# Patient Record
Sex: FEMALE | Race: ASIAN | Employment: UNEMPLOYED | URBAN - METROPOLITAN AREA
[De-identification: names, ages, dates, MRNs, and addresses within clinical notes are randomized per-mention and may not be internally consistent; named-entity substitution may affect disease eponyms.]

---
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
## 2021-06-07 ENCOUNTER — APPOINTMENT (OUTPATIENT)
Dept: GENERAL RADIOLOGY | Age: 21
End: 2021-06-07
Attending: NURSE PRACTITIONER
Payer: MEDICAID

## 2021-06-07 ENCOUNTER — HOSPITAL ENCOUNTER (EMERGENCY)
Age: 21
Discharge: HOME | End: 2021-06-07
Attending: STUDENT IN AN ORGANIZED HEALTH CARE EDUCATION/TRAINING PROGRAM
Payer: MEDICAID

## 2021-06-07 ENCOUNTER — APPOINTMENT (OUTPATIENT)
Dept: CT IMAGING | Age: 21
End: 2021-06-07
Attending: NURSE PRACTITIONER
Payer: MEDICAID

## 2021-06-07 ENCOUNTER — APPOINTMENT (OUTPATIENT)
Dept: ULTRASOUND IMAGING | Age: 21
End: 2021-06-07
Attending: NURSE PRACTITIONER
Payer: MEDICAID

## 2021-06-07 VITALS
RESPIRATION RATE: 16 BRPM | TEMPERATURE: 98.3 F | OXYGEN SATURATION: 100 % | SYSTOLIC BLOOD PRESSURE: 126 MMHG | HEART RATE: 80 BPM | DIASTOLIC BLOOD PRESSURE: 78 MMHG | WEIGHT: 115.08 LBS

## 2021-06-07 DIAGNOSIS — R05.9 COUGH: Primary | ICD-10-CM

## 2021-06-07 DIAGNOSIS — K59.00 CONSTIPATION, UNSPECIFIED CONSTIPATION TYPE: ICD-10-CM

## 2021-06-07 DIAGNOSIS — R10.84 ABDOMINAL PAIN, GENERALIZED: ICD-10-CM

## 2021-06-07 LAB
ALBUMIN SERPL-MCNC: 3.8 G/DL (ref 3.5–5)
ALBUMIN/GLOB SERPL: 1 {RATIO} (ref 1.1–2.2)
ALP SERPL-CCNC: 91 U/L (ref 45–117)
ALT SERPL-CCNC: 21 U/L (ref 12–78)
ANION GAP SERPL CALC-SCNC: 2 MMOL/L (ref 5–15)
APPEARANCE UR: CLEAR
AST SERPL-CCNC: 16 U/L (ref 15–37)
BACTERIA URNS QL MICRO: ABNORMAL /HPF
BASOPHILS # BLD: 0 K/UL (ref 0–0.1)
BASOPHILS NFR BLD: 1 % (ref 0–1)
BILIRUB SERPL-MCNC: 0.2 MG/DL (ref 0.2–1)
BILIRUB UR QL: NEGATIVE
BUN SERPL-MCNC: 3 MG/DL (ref 6–20)
BUN/CREAT SERPL: 4 (ref 12–20)
CALCIUM SERPL-MCNC: 9 MG/DL (ref 8.5–10.1)
CHLORIDE SERPL-SCNC: 111 MMOL/L (ref 97–108)
CO2 SERPL-SCNC: 27 MMOL/L (ref 21–32)
COLOR UR: ABNORMAL
COMMENT, HOLDF: NORMAL
CREAT SERPL-MCNC: 0.68 MG/DL (ref 0.55–1.02)
CRP SERPL-MCNC: <0.29 MG/DL (ref 0–0.6)
DIFFERENTIAL METHOD BLD: ABNORMAL
EOSINOPHIL # BLD: 0.5 K/UL (ref 0–0.4)
EOSINOPHIL NFR BLD: 7 % (ref 0–7)
EPITH CASTS URNS QL MICRO: ABNORMAL /LPF
ERYTHROCYTE [DISTWIDTH] IN BLOOD BY AUTOMATED COUNT: 15.7 % (ref 11.5–14.5)
ERYTHROCYTE [SEDIMENTATION RATE] IN BLOOD: 17 MM/HR (ref 0–20)
GLOBULIN SER CALC-MCNC: 4 G/DL (ref 2–4)
GLUCOSE SERPL-MCNC: 94 MG/DL (ref 65–100)
GLUCOSE UR STRIP.AUTO-MCNC: NEGATIVE MG/DL
HCG UR QL: NEGATIVE
HCT VFR BLD AUTO: 31.9 % (ref 35–47)
HGB BLD-MCNC: 9.7 G/DL (ref 11.5–16)
HGB UR QL STRIP: NEGATIVE
HYALINE CASTS URNS QL MICRO: ABNORMAL /LPF (ref 0–5)
IMM GRANULOCYTES # BLD AUTO: 0 K/UL (ref 0–0.04)
IMM GRANULOCYTES NFR BLD AUTO: 0 % (ref 0–0.5)
KETONES UR QL STRIP.AUTO: NEGATIVE MG/DL
LEUKOCYTE ESTERASE UR QL STRIP.AUTO: NEGATIVE
LYMPHOCYTES # BLD: 1.6 K/UL (ref 0.8–3.5)
LYMPHOCYTES NFR BLD: 20 % (ref 12–49)
MCH RBC QN AUTO: 24.1 PG (ref 26–34)
MCHC RBC AUTO-ENTMCNC: 30.4 G/DL (ref 30–36.5)
MCV RBC AUTO: 79.4 FL (ref 80–99)
MONOCYTES # BLD: 0.7 K/UL (ref 0–1)
MONOCYTES NFR BLD: 9 % (ref 5–13)
NEUTS SEG # BLD: 4.9 K/UL (ref 1.8–8)
NEUTS SEG NFR BLD: 63 % (ref 32–75)
NITRITE UR QL STRIP.AUTO: NEGATIVE
NRBC # BLD: 0 K/UL (ref 0–0.01)
NRBC BLD-RTO: 0 PER 100 WBC
PH UR STRIP: 6 [PH] (ref 5–8)
PLATELET # BLD AUTO: 263 K/UL (ref 150–400)
PMV BLD AUTO: 10.8 FL (ref 8.9–12.9)
POTASSIUM SERPL-SCNC: 3.8 MMOL/L (ref 3.5–5.1)
PROT SERPL-MCNC: 7.8 G/DL (ref 6.4–8.2)
PROT UR STRIP-MCNC: NEGATIVE MG/DL
RBC # BLD AUTO: 4.02 M/UL (ref 3.8–5.2)
RBC #/AREA URNS HPF: ABNORMAL /HPF (ref 0–5)
SAMPLES BEING HELD,HOLD: NORMAL
SARS-COV-2, COV2: NORMAL
SODIUM SERPL-SCNC: 140 MMOL/L (ref 136–145)
SP GR UR REFRACTOMETRY: 1.02 (ref 1–1.03)
UR CULT HOLD, URHOLD: NORMAL
UROBILINOGEN UR QL STRIP.AUTO: 0.2 EU/DL (ref 0.2–1)
WBC # BLD AUTO: 7.7 K/UL (ref 3.6–11)
WBC URNS QL MICRO: ABNORMAL /HPF (ref 0–4)

## 2021-06-07 PROCEDURE — 86140 C-REACTIVE PROTEIN: CPT

## 2021-06-07 PROCEDURE — 81025 URINE PREGNANCY TEST: CPT

## 2021-06-07 PROCEDURE — 36415 COLL VENOUS BLD VENIPUNCTURE: CPT

## 2021-06-07 PROCEDURE — 85652 RBC SED RATE AUTOMATED: CPT

## 2021-06-07 PROCEDURE — 71046 X-RAY EXAM CHEST 2 VIEWS: CPT

## 2021-06-07 PROCEDURE — 99284 EMERGENCY DEPT VISIT MOD MDM: CPT

## 2021-06-07 PROCEDURE — 80053 COMPREHEN METABOLIC PANEL: CPT

## 2021-06-07 PROCEDURE — 74011250637 HC RX REV CODE- 250/637: Performed by: NURSE PRACTITIONER

## 2021-06-07 PROCEDURE — 76856 US EXAM PELVIC COMPLETE: CPT

## 2021-06-07 PROCEDURE — 85025 COMPLETE CBC W/AUTO DIFF WBC: CPT

## 2021-06-07 PROCEDURE — 81001 URINALYSIS AUTO W/SCOPE: CPT

## 2021-06-07 PROCEDURE — U0005 INFEC AGEN DETEC AMPLI PROBE: HCPCS

## 2021-06-07 PROCEDURE — 74011000636 HC RX REV CODE- 636: Performed by: RADIOLOGY

## 2021-06-07 PROCEDURE — 74177 CT ABD & PELVIS W/CONTRAST: CPT

## 2021-06-07 PROCEDURE — 76830 TRANSVAGINAL US NON-OB: CPT

## 2021-06-07 RX ORDER — IBUPROFEN 600 MG/1
600 TABLET ORAL
Qty: 20 TABLET | Refills: 0 | Status: SHIPPED | OUTPATIENT
Start: 2021-06-07 | End: 2022-01-03 | Stop reason: ALTCHOICE

## 2021-06-07 RX ORDER — IBUPROFEN 600 MG/1
600 TABLET ORAL
Status: COMPLETED | OUTPATIENT
Start: 2021-06-07 | End: 2021-06-07

## 2021-06-07 RX ORDER — POLYETHYLENE GLYCOL 3350 17 G/17G
17 POWDER, FOR SOLUTION ORAL DAILY
Qty: 595 G | Refills: 0 | Status: SHIPPED | OUTPATIENT
Start: 2021-06-07 | End: 2022-01-03 | Stop reason: ALTCHOICE

## 2021-06-07 RX ADMIN — IBUPROFEN 600 MG: 600 TABLET ORAL at 17:12

## 2021-06-07 RX ADMIN — IOPAMIDOL 100 ML: 755 INJECTION, SOLUTION INTRAVENOUS at 18:42

## 2021-06-07 NOTE — DISCHARGE INSTRUCTIONS
Start miralax tomorrow as prescribed  Drink more water, eat more fruits and vegetables and fiber in diet  Follow up with your primary provider

## 2021-06-07 NOTE — ED TRIAGE NOTES
Triage: Pt brought in for cough started 3-4 days. CP/stomach pain with coughing. Subjective fever on Saturday. No sick contacts. Took dayquil PTA.

## 2021-06-07 NOTE — ED PROVIDER NOTES
This is a 56-year-old female with chief complaint of cough and chest pain with coughing and abdominal pain for the last 3 days. She states when she coughs it makes her abdominal pain worse. She denies any vomiting or diarrhea but she has had tactile fever 2 days ago on Saturday she states she does not think she has had a fever since then. She did take a dose of DayQuil today no other medications taken besides some Motrin when she had the fever. She denies any headache sore throat neck pain or back pain. She denies dysuria hematuria or urinary frequency. She reports she has been drinking well with decreased appetite. Past medical history: Anemia, headaches  Social: Vaccines up-to-date lives at home with family; she is vaccinated for Covid    The history is provided by the patient. Cough  Pertinent negatives include no chest pain, no headaches, no sore throat, no wheezing and no vomiting. Past Medical History:   Diagnosis Date    Anemia     Headache        History reviewed. No pertinent surgical history.       Family History:   Problem Relation Age of Onset    No Known Problems Mother     Hypertension Father        Social History     Socioeconomic History    Marital status: SINGLE     Spouse name: Not on file    Number of children: Not on file    Years of education: Not on file    Highest education level: Not on file   Occupational History    Not on file   Tobacco Use    Smoking status: Never Smoker    Smokeless tobacco: Never Used   Substance and Sexual Activity    Alcohol use: No    Drug use: No    Sexual activity: Never   Other Topics Concern    Not on file   Social History Narrative    Not on file     Social Determinants of Health     Financial Resource Strain:     Difficulty of Paying Living Expenses:    Food Insecurity:     Worried About Running Out of Food in the Last Year:     Ran Out of Food in the Last Year:    Transportation Needs:     Lack of Transportation (Medical):  Lack of Transportation (Non-Medical):    Physical Activity:     Days of Exercise per Week:     Minutes of Exercise per Session:    Stress:     Feeling of Stress :    Social Connections:     Frequency of Communication with Friends and Family:     Frequency of Social Gatherings with Friends and Family:     Attends Spiritism Services:     Active Member of Clubs or Organizations:     Attends Club or Organization Meetings:     Marital Status:    Intimate Partner Violence:     Fear of Current or Ex-Partner:     Emotionally Abused:     Physically Abused:     Sexually Abused: ALLERGIES: Patient has no known allergies. Review of Systems   Constitutional: Negative. Negative for activity change, appetite change and fever. HENT: Negative. Negative for sore throat. Respiratory: Positive for cough. Negative for wheezing. Cardiovascular: Negative. Negative for chest pain. Gastrointestinal: Positive for abdominal pain. Negative for diarrhea and vomiting. Genitourinary: Negative. Musculoskeletal: Negative. Negative for back pain and neck pain. Skin: Negative. Negative for rash. Neurological: Negative. Negative for headaches. All other systems reviewed and are negative. Vitals:    06/07/21 1607 06/07/21 1614   BP: (!) 132/91    Pulse: 90    Resp: 16    Temp:  98.3 °F (36.8 °C)   SpO2: 100%    Weight: 52.2 kg (115 lb 1.3 oz)             Physical Exam  Vitals and nursing note reviewed. Constitutional:       General: She is not in acute distress. Appearance: Normal appearance. She is well-developed. She is not ill-appearing. HENT:      Right Ear: Tympanic membrane and external ear normal.      Left Ear: Tympanic membrane and external ear normal.      Mouth/Throat:      Mouth: Mucous membranes are moist.      Pharynx: No oropharyngeal exudate or posterior oropharyngeal erythema. Eyes:      Pupils: Pupils are equal, round, and reactive to light.    Cardiovascular: Rate and Rhythm: Normal rate and regular rhythm. Heart sounds: Normal heart sounds. Pulmonary:      Effort: Pulmonary effort is normal. Tachypnea present. No respiratory distress. Breath sounds: Normal breath sounds. No decreased breath sounds, wheezing, rhonchi or rales. Comments: Lungs clear to auscultation although she does appear to have some mild increased work of breathing on exam.  Abdominal:      General: Abdomen is flat. Bowel sounds are normal. There is no distension. Palpations: Abdomen is soft. Tenderness: There is abdominal tenderness in the right lower quadrant, suprapubic area and left lower quadrant. There is guarding. There is no rebound. Musculoskeletal:         General: No tenderness. Normal range of motion. Cervical back: Normal range of motion and neck supple. Lymphadenopathy:      Cervical: No cervical adenopathy. Skin:     General: Skin is warm and dry. Capillary Refill: Capillary refill takes less than 2 seconds. Neurological:      General: No focal deficit present. Mental Status: She is alert and oriented to person, place, and time. Psychiatric:         Mood and Affect: Mood normal.          MDM  Number of Diagnoses or Management Options  Abdominal pain, generalized  Constipation, unspecified constipation type  Cough  Diagnosis management comments: 71-year-old female with cough and URI symptoms for the last 3 days now with some chest pain and abdominal pain worse with coughing. She has no hypoxia and mild increased work of breathing on exam.  Lungs are clear without any wheezing rales or rhonchi. No fever here.     Plan: Chest x-ray, ultrasound abdomen and urine hCG and UA       Amount and/or Complexity of Data Reviewed  Clinical lab tests: ordered and reviewed  Tests in the radiology section of CPT®: ordered and reviewed  Obtain history from someone other than the patient: yes    Risk of Complications, Morbidity, and/or Mortality  Presenting problems: moderate           Procedures      Recent Results (from the past 24 hour(s))   URINALYSIS W/MICROSCOPIC    Collection Time: 06/07/21  4:23 PM   Result Value Ref Range    Color YELLOW/STRAW      Appearance CLEAR CLEAR      Specific gravity 1.021 1.003 - 1.030      pH (UA) 6.0 5.0 - 8.0      Protein Negative NEG mg/dL    Glucose Negative NEG mg/dL    Ketone Negative NEG mg/dL    Bilirubin Negative NEG      Blood Negative NEG      Urobilinogen 0.2 0.2 - 1.0 EU/dL    Nitrites Negative NEG      Leukocyte Esterase Negative NEG      WBC 0-4 0 - 4 /hpf    RBC 0-5 0 - 5 /hpf    Epithelial cells FEW FEW /lpf    Bacteria 1+ (A) NEG /hpf    Hyaline cast 0-2 0 - 5 /lpf   URINE CULTURE HOLD SAMPLE    Collection Time: 06/07/21  4:23 PM    Specimen: Serum; Urine   Result Value Ref Range    Urine culture hold        Urine on hold in Microbiology dept for 2 days. If unpreserved urine is submitted, it cannot be used for addtional testing after 24 hours, recollection will be required. HCG URINE, QL. - POC    Collection Time: 06/07/21  4:24 PM   Result Value Ref Range    Pregnancy test,urine (POC) Negative NEG         XR CHEST PA LAT    Result Date: 6/7/2021  Indication:  cough, chest pain Exam: PA and lateral views of the chest. Direct comparison is made to prior CXR dated April 7, 2017. Findings: Cardiomediastinal silhouette is within normal limits. Lungs are clear bilaterally. Pleural spaces are normal. Osseous structures are intact. No acute cardiopulmonary disease. US TRANSVAGINAL    Result Date: 6/7/2021  Clinical history: lower abdominal pain, worse on right side INDICATION:   lower abdominal pain, worse on right side COMPARISON/CORRELATION STUDY: 8.8 x 3.7 x 5.4 FINDINGS: PELVIC ULTRASOUND: Realtime sonographic imaging of the pelvis was performed transabdominally. Limited evaluation due to bowel gas/.     Limited visualization due to bowel gas.  Correlation with transvaginal ultrasound will be performed. TRANSVAGINAL ULTRASOUND: Realtime sonographic imaging of the pelvis was performed transvaginally. The uterus  is normal in appearance. The endometrial stripe measures 7 mm. The right ovary measures 3.0 x 1.7 cm and the left ovary measures 3.5 x 2.6 cm. There is normal flow identified to the ovaries. Small left ovarian cyst. The ovaries are normal in appearance. No free fluid. IMPRESSION: Normal pelvic ultrasound. US PELV NON OBS    Result Date: 6/7/2021  Clinical history: lower abdominal pain, worse on right side INDICATION:   lower abdominal pain, worse on right side COMPARISON/CORRELATION STUDY: 8.8 x 3.7 x 5.4 FINDINGS: PELVIC ULTRASOUND: Realtime sonographic imaging of the pelvis was performed transabdominally. Limited evaluation due to bowel gas/.     Limited visualization due to bowel gas. Correlation with transvaginal ultrasound will be performed. TRANSVAGINAL ULTRASOUND: Realtime sonographic imaging of the pelvis was performed transvaginally. The uterus  is normal in appearance. The endometrial stripe measures 7 mm. The right ovary measures 3.0 x 1.7 cm and the left ovary measures 3.5 x 2.6 cm. There is normal flow identified to the ovaries. Small left ovarian cyst. The ovaries are normal in appearance. No free fluid. IMPRESSION: Normal pelvic ultrasound. Ultrasound , cxr, and ua results reviewed; patient still with rlq abdominal pain on exam; will obtain ct scan abd pel and labs; patient agreeable with plan.       Recent Results (from the past 24 hour(s))   URINALYSIS W/MICROSCOPIC    Collection Time: 06/07/21  4:23 PM   Result Value Ref Range    Color YELLOW/STRAW      Appearance CLEAR CLEAR      Specific gravity 1.021 1.003 - 1.030      pH (UA) 6.0 5.0 - 8.0      Protein Negative NEG mg/dL    Glucose Negative NEG mg/dL    Ketone Negative NEG mg/dL    Bilirubin Negative NEG      Blood Negative NEG      Urobilinogen 0.2 0.2 - 1.0 EU/dL    Nitrites Negative NEG Leukocyte Esterase Negative NEG      WBC 0-4 0 - 4 /hpf    RBC 0-5 0 - 5 /hpf    Epithelial cells FEW FEW /lpf    Bacteria 1+ (A) NEG /hpf    Hyaline cast 0-2 0 - 5 /lpf   URINE CULTURE HOLD SAMPLE    Collection Time: 06/07/21  4:23 PM    Specimen: Serum; Urine   Result Value Ref Range    Urine culture hold        Urine on hold in Microbiology dept for 2 days. If unpreserved urine is submitted, it cannot be used for addtional testing after 24 hours, recollection will be required. HCG URINE, QL. - POC    Collection Time: 06/07/21  4:24 PM   Result Value Ref Range    Pregnancy test,urine (POC) Negative NEG     SARS-COV-2    Collection Time: 06/07/21  5:39 PM   Result Value Ref Range    SARS-CoV-2 Please find results under separate order     CBC WITH AUTOMATED DIFF    Collection Time: 06/07/21  5:39 PM   Result Value Ref Range    WBC 7.7 3.6 - 11.0 K/uL    RBC 4.02 3.80 - 5.20 M/uL    HGB 9.7 (L) 11.5 - 16.0 g/dL    HCT 31.9 (L) 35.0 - 47.0 %    MCV 79.4 (L) 80.0 - 99.0 FL    MCH 24.1 (L) 26.0 - 34.0 PG    MCHC 30.4 30.0 - 36.5 g/dL    RDW 15.7 (H) 11.5 - 14.5 %    PLATELET 738 276 - 192 K/uL    MPV 10.8 8.9 - 12.9 FL    NRBC 0.0 0  WBC    ABSOLUTE NRBC 0.00 0.00 - 0.01 K/uL    NEUTROPHILS 63 32 - 75 %    LYMPHOCYTES 20 12 - 49 %    MONOCYTES 9 5 - 13 %    EOSINOPHILS 7 0 - 7 %    BASOPHILS 1 0 - 1 %    IMMATURE GRANULOCYTES 0 0.0 - 0.5 %    ABS. NEUTROPHILS 4.9 1.8 - 8.0 K/UL    ABS. LYMPHOCYTES 1.6 0.8 - 3.5 K/UL    ABS. MONOCYTES 0.7 0.0 - 1.0 K/UL    ABS. EOSINOPHILS 0.5 (H) 0.0 - 0.4 K/UL    ABS. BASOPHILS 0.0 0.0 - 0.1 K/UL    ABS. IMM.  GRANS. 0.0 0.00 - 0.04 K/UL    DF AUTOMATED     METABOLIC PANEL, COMPREHENSIVE    Collection Time: 06/07/21  5:39 PM   Result Value Ref Range    Sodium 140 136 - 145 mmol/L    Potassium 3.8 3.5 - 5.1 mmol/L    Chloride 111 (H) 97 - 108 mmol/L    CO2 27 21 - 32 mmol/L    Anion gap 2 (L) 5 - 15 mmol/L    Glucose 94 65 - 100 mg/dL    BUN 3 (L) 6 - 20 MG/DL    Creatinine 0. 68 0.55 - 1.02 MG/DL    BUN/Creatinine ratio 4 (L) 12 - 20      GFR est AA >60 >60 ml/min/1.73m2    GFR est non-AA >60 >60 ml/min/1.73m2    Calcium 9.0 8.5 - 10.1 MG/DL    Bilirubin, total 0.2 0.2 - 1.0 MG/DL    ALT (SGPT) 21 12 - 78 U/L    AST (SGOT) 16 15 - 37 U/L    Alk. phosphatase 91 45 - 117 U/L    Protein, total 7.8 6.4 - 8.2 g/dL    Albumin 3.8 3.5 - 5.0 g/dL    Globulin 4.0 2.0 - 4.0 g/dL    A-G Ratio 1.0 (L) 1.1 - 2.2     C REACTIVE PROTEIN, QT    Collection Time: 06/07/21  5:39 PM   Result Value Ref Range    C-Reactive protein <0.29 0.00 - 0.60 mg/dL   SED RATE (ESR)    Collection Time: 06/07/21  5:39 PM   Result Value Ref Range    Sed rate, automated 17 0 - 20 mm/hr   SAMPLES BEING HELD    Collection Time: 06/07/21  5:39 PM   Result Value Ref Range    SAMPLES BEING HELD 1RED     COMMENT        Add-on orders for these samples will be processed based on acceptable specimen integrity and analyte stability, which may vary by analyte. XR CHEST PA LAT    Result Date: 6/7/2021  Indication:  cough, chest pain Exam: PA and lateral views of the chest. Direct comparison is made to prior CXR dated April 7, 2017. Findings: Cardiomediastinal silhouette is within normal limits. Lungs are clear bilaterally. Pleural spaces are normal. Osseous structures are intact. No acute cardiopulmonary disease. CT ABD PELV W CONT    Result Date: 6/7/2021  CLINICAL HISTORY: Right lower quadrant pain INDICATION: Right lower quadrant pain COMPARISON: None. CONTRAST: 100  ml Isovue 370 TECHNIQUE: Multislice helical CT was performed of the abdomen and pelvis following uneventful rapid bolus intravenous contrast administration. Oral contrast was not administered. Contiguous 5 mm axial images were reconstructed and lung and soft tissue windows were generated. Coronal and sagittal reformations were generated.   CT dose reduction was achieved through use of a standardized protocol tailored for this examination and automatic exposure control for dose modulation. FINDINGS: LUNG BASES:No mass lesion or effusion. LIVER: No mass or biliary dilatation. There is no intrahepatic duct dilatation. There is no hepatic parenchymal mass. Hepatic enhancement pattern is within normal limits. Portal vein is patent. GALLBLADDER:  No dilatation or wall thickening. SPLEEN/PANCREAS: No mass. There is no pancreatic duct dilatation. There is no evidence of splenomegaly. ADRENALS/KIDNEYS: No mass. There is no hydronephrosis. There is no renal mass. There is no perinephric mass. STOMACH: No dilatation or wall thickening. COLON AND SMALL BOWEL: Fecal stasis. There is no free intraperitoneal air. There is no evidence of incarceration or obstruction. No mesenteric adenopathy. PERITONEUM: Unremarkable. APPENDIX: Unremarkable. BLADDER/REPRODUCTIVE ORGANS: No mass or calculus. RETROPERITONEUM: Unremarkable. The abdominal aorta is normal in caliber. No aneurysm. No retroperitoneal adenopathy. OSSEOUS STRUCTURES: No destructive bone lesion. No acute intraperitoneal process is identified. Please see above for additional nonemergent incidental findings. US TRANSVAGINAL    Result Date: 6/7/2021  Clinical history: lower abdominal pain, worse on right side INDICATION:   lower abdominal pain, worse on right side COMPARISON/CORRELATION STUDY: 8.8 x 3.7 x 5.4 FINDINGS: PELVIC ULTRASOUND: Realtime sonographic imaging of the pelvis was performed transabdominally. Limited evaluation due to bowel gas/.     Limited visualization due to bowel gas. Correlation with transvaginal ultrasound will be performed. TRANSVAGINAL ULTRASOUND: Realtime sonographic imaging of the pelvis was performed transvaginally. The uterus  is normal in appearance. The endometrial stripe measures 7 mm. The right ovary measures 3.0 x 1.7 cm and the left ovary measures 3.5 x 2.6 cm. There is normal flow identified to the ovaries. Small left ovarian cyst. The ovaries are normal in appearance.  No free fluid. IMPRESSION: Normal pelvic ultrasound. US PELV NON OBS    Result Date: 6/7/2021  Clinical history: lower abdominal pain, worse on right side INDICATION:   lower abdominal pain, worse on right side COMPARISON/CORRELATION STUDY: 8.8 x 3.7 x 5.4 FINDINGS: PELVIC ULTRASOUND: Realtime sonographic imaging of the pelvis was performed transabdominally. Limited evaluation due to bowel gas/.     Limited visualization due to bowel gas. Correlation with transvaginal ultrasound will be performed. TRANSVAGINAL ULTRASOUND: Realtime sonographic imaging of the pelvis was performed transvaginally. The uterus  is normal in appearance. The endometrial stripe measures 7 mm. The right ovary measures 3.0 x 1.7 cm and the left ovary measures 3.5 x 2.6 cm. There is normal flow identified to the ovaries. Small left ovarian cyst. The ovaries are normal in appearance. No free fluid. IMPRESSION: Normal pelvic ultrasound. Patient's results have been reviewed with them. Patient and /or family have verbally conveyed understanding and agreement of the patient's signs, symptoms, diagnosis, treatment and prognosis and additionally agree to follow up as recommended or return to the Emergency Department should their condition change prior to follow-up. Discharge instructions have also been provided to the patient with some educational information regarding their diagnosis as well as a list of reasons why they would want to return to the ER prior to their follow-up appointment should their condition change.

## 2021-06-07 NOTE — ED NOTES
Pt resting comfortably in stretcher. No needs @ this time. Plan of care updated and reports improved pain to stomach.

## 2021-06-08 ENCOUNTER — PATIENT OUTREACH (OUTPATIENT)
Dept: CASE MANAGEMENT | Age: 21
End: 2021-06-08

## 2021-06-08 LAB
SARS-COV-2, XPLCVT: NOT DETECTED
SOURCE, COVRS: NORMAL

## 2021-06-08 NOTE — ED NOTES
Pt discharged home accompanied by visitor. Pt acting appropriately, respirations regular and unlabored, cap refill less than two seconds. Skin pink, dry and warm. Lungs clear bilaterally. No further complaints at this time. Patient verbalized understanding of discharge paperwork and has no further questions at this time. Education provided about continuation of care, follow up care and medication administration: . Patient able to provided teach back about discharge instructions.

## 2021-06-08 NOTE — PROGRESS NOTES
Patient contacted regarding COVID-19 risk. Discussed COVID-19 related testing which was available at this time. Test results were pending. Patient informed of results, if available? no.     LPN Care Coordinator contacted the patient by telephone to perform post discharge assessment. Call within 2 business days of discharge: Yes Verified name and  with patient as identifiers. Provided introduction to self, and explanation of the CTN/ACM role, and reason for call due to risk factors for infection and/or exposure to COVID-19. Symptoms reviewed with patient who verbalized the following symptoms: no worsening symptoms      Due to no new or worsening symptoms encounter was not routed to provider for escalation. Discussed follow-up appointments. If no appointment was previously scheduled, appointment scheduling offered:  no. [facility] follow up appointment(s): No future appointments. Non-[facility] follow up appointment(s): Encouraged to follow-up with new provider per discharge instructions. Interventions to address risk factors: Obtained and reviewed discharge summary and/or continuity of care documents     Advance Care Planning:   Does patient have an Advance Directive: patient declined education. Educated patient about risk for severe COVID-19 due to risk factors according to CDC guidelines. LPN CC reviewed discharge instructions, medical action plan and red flag symptoms with the patient who verbalized understanding. Discussed COVID vaccination status: no. Education provided on COVID-19 vaccination as appropriate. Discussed exposure protocols and quarantine with CDC Guidelines. Patient was given an opportunity to verbalize any questions and concerns and agrees to contact LPN CC or health care provider for questions related to their healthcare. Reviewed and educated patient on any new and changed medications related to discharge diagnosis     Was patient discharged with a pulse oximeter?  no Discussed and confirmed pulse oximeter discharge instructions and when to notify provider or seek emergency care. LPN CC provided contact information. Plan for follow-up call in 7-14 days based on severity of symptoms and risk factors.

## 2021-06-28 ENCOUNTER — PATIENT OUTREACH (OUTPATIENT)
Dept: CASE MANAGEMENT | Age: 21
End: 2021-06-28

## 2021-06-28 NOTE — PROGRESS NOTES
Patient resolved from Transition of Care episode on 06/28/21. ACM/CTN was unsuccessful at contacting this patient today. Patient/family was provided the following resources and education related to COVID-19 during the initial call:                         Signs, symptoms and red flags related to COVID-19            CDC exposure and quarantine guidelines            [employer] exposure contact - [phone]            Contact for their local Department of Health                 Patient has not had any additional ED or hospital visits. No further outreach scheduled with this CTN/ACM. Episode of Care resolved. Patient has this CTN/ACM contact information if future needs arise.

## 2021-10-26 ENCOUNTER — HOSPITAL ENCOUNTER (EMERGENCY)
Age: 21
Discharge: HOME | End: 2021-10-26
Attending: EMERGENCY MEDICINE
Payer: MEDICAID

## 2021-10-26 ENCOUNTER — APPOINTMENT (OUTPATIENT)
Dept: ULTRASOUND IMAGING | Age: 21
End: 2021-10-26
Attending: EMERGENCY MEDICINE
Payer: MEDICAID

## 2021-10-26 VITALS
TEMPERATURE: 98.1 F | OXYGEN SATURATION: 100 % | WEIGHT: 117.28 LBS | SYSTOLIC BLOOD PRESSURE: 107 MMHG | DIASTOLIC BLOOD PRESSURE: 66 MMHG | RESPIRATION RATE: 16 BRPM | HEART RATE: 101 BPM

## 2021-10-26 DIAGNOSIS — Z32.01 PREGNANCY TEST PERFORMED, PREGNANCY CONFIRMED: Primary | ICD-10-CM

## 2021-10-26 LAB
ALBUMIN SERPL-MCNC: 3.1 G/DL (ref 3.5–5)
ALBUMIN/GLOB SERPL: 0.7 {RATIO} (ref 1.1–2.2)
ALP SERPL-CCNC: 65 U/L (ref 45–117)
ALT SERPL-CCNC: 19 U/L (ref 12–78)
ANION GAP SERPL CALC-SCNC: 7 MMOL/L (ref 5–15)
APPEARANCE UR: CLEAR
AST SERPL-CCNC: 20 U/L (ref 15–37)
BACTERIA URNS QL MICRO: ABNORMAL /HPF
BASOPHILS # BLD: 0 K/UL (ref 0–0.1)
BASOPHILS NFR BLD: 0 % (ref 0–1)
BILIRUB SERPL-MCNC: 0.3 MG/DL (ref 0.2–1)
BILIRUB UR QL: NEGATIVE
BUN SERPL-MCNC: 4 MG/DL (ref 6–20)
BUN/CREAT SERPL: 9 (ref 12–20)
CALCIUM SERPL-MCNC: 8.9 MG/DL (ref 8.5–10.1)
CHLORIDE SERPL-SCNC: 107 MMOL/L (ref 97–108)
CO2 SERPL-SCNC: 21 MMOL/L (ref 21–32)
COLOR UR: ABNORMAL
COMMENT, HOLDF: NORMAL
CREAT SERPL-MCNC: 0.46 MG/DL (ref 0.55–1.02)
DIFFERENTIAL METHOD BLD: ABNORMAL
EOSINOPHIL # BLD: 0.1 K/UL (ref 0–0.4)
EOSINOPHIL NFR BLD: 1 % (ref 0–7)
EPITH CASTS URNS QL MICRO: ABNORMAL /LPF
ERYTHROCYTE [DISTWIDTH] IN BLOOD BY AUTOMATED COUNT: 17.2 % (ref 11.5–14.5)
GLOBULIN SER CALC-MCNC: 4.4 G/DL (ref 2–4)
GLUCOSE SERPL-MCNC: 78 MG/DL (ref 65–100)
GLUCOSE UR STRIP.AUTO-MCNC: NEGATIVE MG/DL
HCG SERPL-ACNC: ABNORMAL MIU/ML (ref 0–6)
HCG UR QL: POSITIVE
HCT VFR BLD AUTO: 28.3 % (ref 35–47)
HGB BLD-MCNC: 9 G/DL (ref 11.5–16)
HGB UR QL STRIP: NEGATIVE
IMM GRANULOCYTES # BLD AUTO: 0.1 K/UL (ref 0–0.04)
IMM GRANULOCYTES NFR BLD AUTO: 1 % (ref 0–0.5)
KETONES UR QL STRIP.AUTO: NEGATIVE MG/DL
LEUKOCYTE ESTERASE UR QL STRIP.AUTO: NEGATIVE
LYMPHOCYTES # BLD: 1.1 K/UL (ref 0.8–3.5)
LYMPHOCYTES NFR BLD: 13 % (ref 12–49)
MCH RBC QN AUTO: 22.7 PG (ref 26–34)
MCHC RBC AUTO-ENTMCNC: 31.8 G/DL (ref 30–36.5)
MCV RBC AUTO: 71.3 FL (ref 80–99)
MONOCYTES # BLD: 0.6 K/UL (ref 0–1)
MONOCYTES NFR BLD: 7 % (ref 5–13)
NEUTS SEG # BLD: 6.8 K/UL (ref 1.8–8)
NEUTS SEG NFR BLD: 78 % (ref 32–75)
NITRITE UR QL STRIP.AUTO: NEGATIVE
NRBC # BLD: 0 K/UL (ref 0–0.01)
NRBC BLD-RTO: 0 PER 100 WBC
PH UR STRIP: 7 [PH] (ref 5–8)
PLATELET # BLD AUTO: 270 K/UL (ref 150–400)
PMV BLD AUTO: 10.8 FL (ref 8.9–12.9)
POTASSIUM SERPL-SCNC: 3.1 MMOL/L (ref 3.5–5.1)
PROT SERPL-MCNC: 7.5 G/DL (ref 6.4–8.2)
PROT UR STRIP-MCNC: NEGATIVE MG/DL
RBC # BLD AUTO: 3.97 M/UL (ref 3.8–5.2)
RBC #/AREA URNS HPF: ABNORMAL /HPF (ref 0–5)
SAMPLES BEING HELD,HOLD: NORMAL
SODIUM SERPL-SCNC: 135 MMOL/L (ref 136–145)
SP GR UR REFRACTOMETRY: 1.02 (ref 1–1.03)
UR CULT HOLD, URHOLD: NORMAL
UROBILINOGEN UR QL STRIP.AUTO: 0.2 EU/DL (ref 0.2–1)
WBC # BLD AUTO: 8.6 K/UL (ref 3.6–11)
WBC URNS QL MICRO: ABNORMAL /HPF (ref 0–4)

## 2021-10-26 PROCEDURE — 85025 COMPLETE CBC W/AUTO DIFF WBC: CPT

## 2021-10-26 PROCEDURE — 81025 URINE PREGNANCY TEST: CPT

## 2021-10-26 PROCEDURE — 84702 CHORIONIC GONADOTROPIN TEST: CPT

## 2021-10-26 PROCEDURE — 80053 COMPREHEN METABOLIC PANEL: CPT

## 2021-10-26 PROCEDURE — 74011250637 HC RX REV CODE- 250/637: Performed by: EMERGENCY MEDICINE

## 2021-10-26 PROCEDURE — 76815 OB US LIMITED FETUS(S): CPT

## 2021-10-26 PROCEDURE — 36415 COLL VENOUS BLD VENIPUNCTURE: CPT

## 2021-10-26 PROCEDURE — 81001 URINALYSIS AUTO W/SCOPE: CPT

## 2021-10-26 PROCEDURE — 99284 EMERGENCY DEPT VISIT MOD MDM: CPT

## 2021-10-26 RX ORDER — ONDANSETRON 4 MG/1
4 TABLET, ORALLY DISINTEGRATING ORAL
Status: DISCONTINUED | OUTPATIENT
Start: 2021-10-26 | End: 2021-10-26

## 2021-10-26 NOTE — ED NOTES
Pt discharged home with parent/guardian. Pt acting age appropriately, respirations regular and unlabored, cap refill less than two seconds. Skin pink, dry and warm. Lungs clear bilaterally. No further complaints at this time. Parent/guardian verbalized understanding of discharge paperwork and has no further questions at this time. Education provided about continuation of care, follow up care and medication administration: prenatal vitamins, tylenol dosing, f/u obgyn. Parent/guardian able to provided teach back about discharge instructions.

## 2021-10-26 NOTE — ED PROVIDER NOTES
Pt is a 24year old female, with history and anemia and headache, presents to the ER for nausea/vomiting, chills and LLQ pain. The nausea/vomiting has been having once a day usually associated to food and for the past 2 weeks. The chills happens at night the past 2 nights. The LLQ pain has been the past couple days. The pain is an aching pain which comes and goes. It is worse with palpation. She has not taken anything for pain. She has been having unprotected intercourse the last time was yesterday. She does express she could be pregnant. She denies any fever, vaginal bleeding, vaginal discharge, rash, problems with urination, cough, congestion, chest pain, SOB or headache.  used              Past Medical History:   Diagnosis Date    Anemia     Headache        No past surgical history on file. Family History:   Problem Relation Age of Onset    No Known Problems Mother     Hypertension Father        Social History     Socioeconomic History    Marital status: SINGLE     Spouse name: Not on file    Number of children: Not on file    Years of education: Not on file    Highest education level: Not on file   Occupational History    Not on file   Tobacco Use    Smoking status: Never Smoker    Smokeless tobacco: Never Used   Substance and Sexual Activity    Alcohol use: No    Drug use: No    Sexual activity: Never   Other Topics Concern    Not on file   Social History Narrative    Not on file     Social Determinants of Health     Financial Resource Strain:     Difficulty of Paying Living Expenses:    Food Insecurity:     Worried About Running Out of Food in the Last Year:     Ran Out of Food in the Last Year:    Transportation Needs:     Lack of Transportation (Medical):      Lack of Transportation (Non-Medical):    Physical Activity:     Days of Exercise per Week:     Minutes of Exercise per Session:    Stress:     Feeling of Stress :    Social Connections:     Frequency of Communication with Friends and Family:     Frequency of Social Gatherings with Friends and Family:     Attends Hindu Services:     Active Member of Clubs or Organizations:     Attends Club or Organization Meetings:     Marital Status:    Intimate Partner Violence:     Fear of Current or Ex-Partner:     Emotionally Abused:     Physically Abused:     Sexually Abused: ALLERGIES: Patient has no known allergies. Review of Systems   Constitutional: Negative for fever. HENT: Negative for congestion. Eyes: Negative for pain. Respiratory: Negative for shortness of breath. Cardiovascular: Negative for chest pain and palpitations. Gastrointestinal: Positive for nausea and vomiting. Negative for abdominal pain and diarrhea. Genitourinary: Negative for dysuria. Musculoskeletal: Negative for back pain and neck pain. Skin: Negative for rash. Neurological: Negative for dizziness, light-headedness and headaches. All other systems reviewed and are negative. Vitals:    10/26/21 1625   BP: 119/80   Pulse: 99   Resp: 18   Temp: 98.4 °F (36.9 °C)   SpO2: 100%   Weight: 53.2 kg (117 lb 4.6 oz)            Physical Exam  Vitals and nursing note reviewed. Constitutional:       General: She is not in acute distress. Appearance: She is not diaphoretic. HENT:      Head: Normocephalic and atraumatic. Right Ear: External ear normal.      Left Ear: External ear normal.      Nose: Nose normal.      Mouth/Throat:      Pharynx: No oropharyngeal exudate. Eyes:      General: No scleral icterus. Right eye: No discharge. Left eye: No discharge. Conjunctiva/sclera: Conjunctivae normal.      Pupils: Pupils are equal, round, and reactive to light. Cardiovascular:      Rate and Rhythm: Normal rate and regular rhythm. Heart sounds: Normal heart sounds. No murmur heard. No friction rub. No gallop.     Pulmonary:      Effort: Pulmonary effort is normal. No respiratory distress. Abdominal:      General: Bowel sounds are normal. There is no distension. Palpations: Abdomen is soft. There is no mass. Tenderness: There is abdominal tenderness (LLQ ). There is no guarding or rebound. Musculoskeletal:         General: Normal range of motion. Cervical back: Normal range of motion and neck supple. Skin:     General: Skin is warm and dry. Findings: No rash. Neurological:      Mental Status: She is alert and oriented to person, place, and time. Cranial Nerves: No cranial nerve deficit. Motor: No abnormal muscle tone. Psychiatric:         Behavior: Behavior normal.          MDM  Number of Diagnoses or Management Options  Pregnancy test performed, pregnancy confirmed  Diagnosis management comments: 24year old female who is complaining of nausea, vomiting and LLQ pain. Will start with pregnancy test. If pregnancy will get labs and ultrasound to r/u etopic since having LLQ pain. She is not having vaginal bleeding at this time. Procedures      Have spoken with attending physician about pt complaint and history. Agrees with plan of care in the emergency room. Labs Reviewed   URINALYSIS W/MICROSCOPIC - Abnormal; Notable for the following components:       Result Value    Bacteria 1+ (*)     All other components within normal limits   BETA HCG, QT - Abnormal; Notable for the following components:    Beta HCG, QT 28,162 (*)     All other components within normal limits   CBC WITH AUTOMATED DIFF - Abnormal; Notable for the following components:    HGB 9.0 (*)     HCT 28.3 (*)     MCV 71.3 (*)     MCH 22.7 (*)     RDW 17.2 (*)     NEUTROPHILS 78 (*)     IMMATURE GRANULOCYTES 1 (*)     ABS. IMM.  GRANS. 0.1 (*)     All other components within normal limits   METABOLIC PANEL, COMPREHENSIVE - Abnormal; Notable for the following components:    Sodium 135 (*)     Potassium 3.1 (*)     BUN 4 (*)     Creatinine 0.46 (*)     BUN/Creatinine ratio 9 (*) Albumin 3.1 (*)     Globulin 4.4 (*)     A-G Ratio 0.7 (*)     All other components within normal limits   HCG URINE, QL. - POC - Abnormal; Notable for the following components:    Pregnancy test,urine (POC) Positive (*)     All other components within normal limits   URINE CULTURE HOLD SAMPLE   SAMPLES BEING HELD      Progress note:  Pt is aware that she is pregnant and ultrasound looks normal. She can take a prenatal vitamin. She is to avoid Motrin.  Will have nurse go over the discharge instructions

## 2021-11-02 ENCOUNTER — INITIAL PRENATAL (OUTPATIENT)
Dept: OBGYN CLINIC | Age: 21
End: 2021-11-02

## 2021-11-02 VITALS
HEIGHT: 60 IN | DIASTOLIC BLOOD PRESSURE: 64 MMHG | BODY MASS INDEX: 22.58 KG/M2 | SYSTOLIC BLOOD PRESSURE: 104 MMHG | WEIGHT: 115 LBS

## 2021-11-02 DIAGNOSIS — D50.9 IRON DEFICIENCY ANEMIA, UNSPECIFIED IRON DEFICIENCY ANEMIA TYPE: ICD-10-CM

## 2021-11-02 DIAGNOSIS — Z34.02 ENCOUNTER FOR SUPERVISION OF LOW-RISK FIRST PREGNANCY IN SECOND TRIMESTER: Primary | ICD-10-CM

## 2021-11-02 DIAGNOSIS — Z3A.18 18 WEEKS GESTATION OF PREGNANCY: ICD-10-CM

## 2021-11-02 DIAGNOSIS — O09.30 HISTORY OF INADEQUATE PRENATAL CARE: ICD-10-CM

## 2021-11-02 LAB
ABO + RH BLD: NORMAL
BLOOD BANK CMNT PATIENT-IMP: NORMAL
BLOOD GROUP ANTIBODIES SERPL: NORMAL
HBSAG, EXTERNAL: NEGATIVE
RPR, EXTERNAL: NON REACTIVE
RUBELLA, EXTERNAL: NORMAL
SPECIMEN EXP DATE BLD: NORMAL

## 2021-11-02 PROCEDURE — MISCGLOBALOB GLOBAL OB: Performed by: ADVANCED PRACTICE MIDWIFE

## 2021-11-02 RX ORDER — IRON,CARBONYL/ASCORBIC ACID 65MG-125MG
1 TABLET, DELAYED RELEASE (ENTERIC COATED) ORAL 3 TIMES DAILY
Qty: 90 TABLET | Refills: 3 | Status: SHIPPED | OUTPATIENT
Start: 2021-11-02 | End: 2022-02-23

## 2021-11-02 NOTE — PROGRESS NOTES
Current pregnancy history:    Ramona Johns is a 24 y.o. female who presents for the evaluation of pregnancy. Patient's last menstrual period was 2021 (approximate). LMP history:  The date of her LMP is NOT certain. Her menstrual cycles are very irregular. She was not on the pill at conception. Ultrasound data:  She had an ultrasound done by the ultrasound tech today which revealed a viable dickinson pregnancy with a gestational age of 22 weeks and 6 days giving an [address] of 3/30/22. Ultrasound details:    FETAL SURVEY  A SINGLE VIABLE IUP AT 18W6D WITH EMERALD OF 2022 IS SEEN. FETAL CARDIAC MOTION OBSERVED. FETAL ANATOMY WAS WELL VISUALIZED AND APPEARS WNL. NO ABNORMALITIES WERE SEEN ON TODAYS EXAM.  APPROPRIATE GROWTH MEASURED. SIZE = DATES. SANTA, PLACENTA AND CERVIX APPEAR WITHIN NORMAL LIMITS. GENDER: FEMALE    Pregnancy symptoms:    Since her LMP she has experienced  urinary frequency, breast tenderness, fatigue and nausea. She has been vomiting over the last few weeks. Associated signs and symptoms which she denies: dysuria, discharge, vaginal bleeding. She states she has had no real change in weight. Relevant past pregnancy history:  She has the following pregnancy history: none  She has no history of  delivery. Relevant past medical history:(relevant to this pregnancy): noncontributory. Pap/Occupational history:  Last pap smear: none          Substance history:  Negative for alcohol, tobacco and street drugs. Positive for nothing. Exposure history: There is/are no indoor cat/s in the home. The patient was instructed to not change the cat litter. She admits close contact with children on a regular basis. She has had chicken pox or the vaccine in the past.   Patient denies issues with domestic violence.      Genetic Screening/Teratology Counseling: (Includes patient, baby's father, or anyone in either family with:)  3.  Patient's age >/= 28 at [address]?-- no .   2.  Thalassemia ([facility], [facility], [address], or  background): MCV<80?--no.     3.  Neural tube defect (meningomyelocele, spina bifida, anencephaly)?--no.   4.  Congenital heart defect?--no.  5.  Down syndrome?--no.   6.  Jamey-Sachs ([employer], [place])?--no.   7.  Canavan's Disease?--no.   8.  Familial Dysautonomia?--no.   9.  Sickle cell disease or trait ()? --no   The patient has not been tested for sickle trait  10. Hemophilia or other blood disorders?--no. 11.  Muscular dystrophy?--no. 12.  Cystic fibrosis?--no. 13.  Dayton's Chorea?--no. 14.  Mental retardation/autism (if yes was person tested for Fragile X)?--no. 15.  Other inherited genetic or chromosomal disorder?--no. 12.  Maternal metabolic disorder (DM, PKU, etc)?--no. 17.  Patient or FOB with a child with a birth defect not listed above?--no.  17a. Patient or FOB with a birth defect themselves?--no. 18.  Recurrent pregnancy loss, or stillbirth?--no. 19.  Any medications since LMP other than prenatal vitamins (include vitamins, supplements, OTC meds, drugs, alcohol)?--no. 20.  Any other genetic/environmental exposure to discuss?--no. Infection History:  1. Lives with someone with TB or TB exposed?--no.   2.  Patient or partner has history of genital herpes?--no.  3.  Rash or viral illness since LMP?--no.    4.  History of STD (GC, CT, HPV, syphilis, HIV)? --no   5. Other: OTHER? Past Medical History:   Diagnosis Date    Anemia     Headache      History reviewed. No pertinent surgical history.   Social History     Occupational History    Not on file   Tobacco Use    Smoking status: Never Smoker    Smokeless tobacco: Never Used   Substance and Sexual Activity    Alcohol use: No    Drug use: No    Sexual activity: Yes     Partners: Male     Birth control/protection: None     Family History   Problem Relation Age of Onset    No Known Problems Mother     Hypertension Father        No Known Allergies  Prior to Admission medications    Medication Sig Start Date End Date Taking? Authorizing Provider   ibuprofen (MOTRIN) 600 mg tablet Take 1 Tablet by mouth every six (6) hours as needed for Pain. 6/7/21   Maurizio Marie NP   polyethylene glycol (Miralax) 17 gram/dose powder Take 17 g by mouth daily. 1 tablespoon with 8 oz of water daily 6/7/21   Maurizio Marie NP   ibuprofen (MOTRIN) 600 mg tablet Take 1 Tab by mouth every six (6) hours as needed for Pain (fever > 101). Patient not taking: Reported on 6/7/2021 4/7/17   Evin Allison NP   acetaminophen (TYLENOL) 500 mg tablet Take 1 Tab by mouth every six (6) hours as needed for Pain or Fever (or Headache.). Patient not taking: Reported on 6/7/2021 4/7/17   Evin Allison NP   fluconazole (DIFLUCAN) 150 mg tablet Take 1 Tab by mouth daily. FDA advises cautious prescribing of oral fluconazole in pregnancy. Patient not taking: Reported on 6/7/2021 4/7/17   Evin Allison NP   ferrous sulfate (IRON) 325 mg (65 mg iron) tablet Take  by mouth Daily (before breakfast).   Patient not taking: Reported on 6/7/2021    Provider, Historical        Review of Systems: History obtained from the patient  Constitutional: negative for weight loss, fever, night sweats  HEENT: negative for hearing loss, earache, congestion, snoring, sorethroat  CV: negative for chest pain, palpitations, edema  Resp: negative for cough, shortness of breath, wheezing  Breast: negative for breast lumps, nipple discharge, galactorrhea  GI: negative for change in bowel habits, abdominal pain, black or bloody stools  : negative for frequency, dysuria, hematuria, vaginal discharge  MSK: negative for back pain, joint pain, muscle pain  Skin: negative for itching, rash, hives  Neuro: negative for dizziness, headache, confusion, weakness  Psych: negative for anxiety, depression, change in mood  Heme/lymph: negative for bleeding, bruising, pallor    Objective:  Visit Vitals  BP 104/64   Ht 5' (1.524 m)   Wt 115 lb (52.2 kg)   LMP 06/01/2021 (Approximate)   BMI 22.46 kg/m²       Physical Exam:   PHYSICAL EXAMINATION    Constitutional  · Appearance: well-nourished, well developed, alert, in no acute distress    HENT  · Head  · Face: appears normal  · Eyes: appear normal  · Ears: normal  · Mouth: normal  · Lips: no lesions    Neck  · Inspection/Palpation: normal appearance, no masses or tenderness  · Lymph Nodes: no lymphadenopathy present  · Thyroid: gland size normal, nontender, no nodules or masses present on palpation    Chest  · Respiratory Effort: breathing unlabored  · Auscultation: normal breath sounds    Cardiovascular  · Heart:  · Auscultation: regular rate and rhythm without murmur    Breasts  · Inspection of Breasts: breasts symmetrical, no skin changes, no discharge present, nipple appearance normal, no skin retraction present  · Palpation of Breasts and Axillae: no masses present on palpation, no breast tenderness  · Axillary Lymph Nodes: no lymphadenopathy present    Gastrointestinal  · Abdominal Examination: abdomen non-tender to palpation, normal bowel sounds, no masses present  · Liver and spleen: no hepatomegaly present, spleen not palpable  · Hernias: no hernias identified    Genitourinary  · External Genitalia: normal appearance for age, no discharge present, no tenderness present, no inflammatory lesions present, no masses present, no atrophy present  · Vagina: normal vaginal vault without central or paravaginal defects, no discharge present, no inflammatory lesions present, no masses present  · Bladder: non-tender to palpation  · Urethra: appears normal  · Cervix: normal   · Uterus: enlarged, normal shape, soft  · Adnexa: no adnexal tenderness present, no adnexal masses present  · Perineum: perineum within normal limits, no evidence of trauma, no rashes or skin lesions present  · Anus: anus within normal limits, no hemorrhoids present  · Inguinal Lymph Nodes: no lymphadenopathy present    Skin  · General Inspection: no rash, no lesions identified    Neurologic/Psychiatric  · Mental Status:  · Orientation: grossly oriented to person, place and time  · Mood and Affect: mood normal, affect appropriate    Assessment:   Intrauterine pregnancy with the following problems identified: Late to care. Plan:     Offered CF testing, CVS, Nuchal Translucency, MSAFP, amnio, and discussed NIPT  Course of pregnancy discussed including visit schedule, routine U/S, glucola testing, etc.  Avoid alcoholic beverages and illicit/recreational drugs use  Take prenatal vitamins or folic acid daily. Hospital and practice style discussed with coverage system. Discussed nutrition, toxoplasmosis precautions, sexual activity, exercise, need for influenza vaccine, environmental and work hazards, travel advice, screen for domestic violence, need for seat belts. Discussed seafood, unpasteurized dairy products, deli meat, artificial sweeteners, and caffeine. Information on prenatal classes/breastfeeding given. Information on circumcision given  Patient encouraged not to smoke. Discussed current prescription drug use. Given medication list.  Discussed the use of over the counter medications and chemicals. Route of delivery discussed, including risks, benefits, and alternatives of  versus repeat LTCS. Pt understands risk of hemorrhage during pregnancy and post delivery and would accept blood products if necessary in life-threatening emergencies      Handouts given to pt. Alexsi Stokes.  DYANA Lima/MONISHA

## 2021-11-02 NOTE — PROGRESS NOTES
18yo female  presents as NOB at 18w6d based on [address] in office today. Pregnancy confirmed in ED after patient presented for n/v and feeling hot. Denies surgical and OB hx. Anemia dx in ED. Pt to start taking PNV with iron supplementation. Desires genetic testing today. Horizon and NIPT ordered. NOB labs & prenatal teaching today. Warning signs reviewed. Pt verbalized understanding. RTO 4 wks. FOB involved-works the afternoon/evening shift.      Sophia Costa Sewell, [facility]

## 2021-11-03 LAB
HBV SURFACE AG SER QL: <0.1 INDEX
HBV SURFACE AG SER QL: NEGATIVE
HCV AB SERPL QL IA: NONREACTIVE
HIV 1+2 AB+HIV1 P24 AG SERPL QL IA: NONREACTIVE
HIV12 RESULT COMMENT, HHIVC: NORMAL
RPR SER QL: NONREACTIVE
RUBV IGG SER-IMP: REACTIVE
RUBV IGG SERPL IA-ACNC: 111.6 IU/ML

## 2021-11-04 LAB
BACTERIA SPEC CULT: ABNORMAL
CC UR VC: ABNORMAL
HGB A MFR BLD: 97.9 % (ref 96.4–98.8)
HGB A2 MFR BLD COLUMN CHROM: 2.1 % (ref 1.8–3.2)
HGB F MFR BLD: 0 % (ref 0–2)
HGB FRACT BLD-IMP: NORMAL
HGB S MFR BLD: 0 %
SERVICE CMNT-IMP: ABNORMAL
VZV IGG SER IA-ACNC: 542 INDEX

## 2021-11-05 LAB
C TRACH RRNA SPEC QL NAA+PROBE: NEGATIVE
N GONORRHOEA RRNA SPEC QL NAA+PROBE: NEGATIVE
SPECIMEN SOURCE: NORMAL
T VAGINALIS RRNA VAG QL NAA+PROBE: NEGATIVE

## 2021-11-05 RX ORDER — NITROFURANTOIN 25; 75 MG/1; MG/1
100 CAPSULE ORAL 2 TIMES DAILY
Qty: 14 CAPSULE | Refills: 0 | Status: ON HOLD | OUTPATIENT
Start: 2021-11-05 | End: 2022-02-22 | Stop reason: ALTCHOICE

## 2021-12-03 ENCOUNTER — ROUTINE PRENATAL (OUTPATIENT)
Dept: OBGYN CLINIC | Age: 21
End: 2021-12-03
Payer: MEDICAID

## 2021-12-03 VITALS
BODY MASS INDEX: 24.94 KG/M2 | DIASTOLIC BLOOD PRESSURE: 64 MMHG | HEIGHT: 60 IN | SYSTOLIC BLOOD PRESSURE: 116 MMHG | WEIGHT: 127 LBS

## 2021-12-03 DIAGNOSIS — Z34.02 ENCOUNTER FOR SUPERVISION OF NORMAL FIRST PREGNANCY IN SECOND TRIMESTER: Primary | ICD-10-CM

## 2021-12-03 DIAGNOSIS — Z3A.18 18 WEEKS GESTATION OF PREGNANCY: ICD-10-CM

## 2021-12-03 PROCEDURE — 0502F SUBSEQUENT PRENATAL CARE: CPT | Performed by: ADVANCED PRACTICE MIDWIFE

## 2021-12-03 NOTE — PROGRESS NOTES
Doing well. Has not yet begun to feel movement.   Assisted today by Sen  via [facility]  Pt having some round ligament pain   MERT - 4 week   Discussed GTT for next visit - attempted to discuss

## 2022-01-02 NOTE — PATIENT INSTRUCTIONS
Weeks 26 to 30 of Your Pregnancy: Care Instructions  Overview     You are now entering your last trimester of pregnancy. Your baby is growing quickly. Kacy Mcgrath probably feel your baby moving around more often. Your doctor may ask you to count your baby's kicks. Your back may ache as your body gets used to your baby's size and length. If you haven't already had the Tdap shot during this pregnancy, talk to your doctor about getting it. It will help protect your  against pertussis infection. During this time, it's important to take care of yourself and pay attention to what your body needs. If you feel sexual, you can explore ways to be close with your partner that match your comfort and desire. Follow-up care is a key part of your treatment and safety. Be sure to make and go to all appointments, and call your doctor if you are having problems. It's also a good idea to know your test results and keep a list of the medicines you take. How can you care for yourself at home? Take it easy at work  · Take frequent breaks. If possible, stop working when you are tired, and rest during your lunch hour. · Take bathroom breaks every 2 hours. · Change positions often. If you sit for long periods, stand up and walk around. · When you stand for a long time, keep one foot on a low stool with your knee bent. After standing a lot, sit with your feet up. · Avoid fumes, chemicals, and tobacco smoke. Be sexual in your own way  · Having sex during pregnancy is okay, unless your doctor tells you not to. · You may be very interested in sex, or you may have no interest at all. · Your growing belly can make it hard to find a good position during intercourse. [place] and explore. · You may get cramps in your uterus when your partner touches your breasts. · A back rub may relieve the backache or cramps that sometimes follow orgasm. Learn about  labor  · Watch for signs of  labor.  You may be going into labor if:  ? You have menstrual-like cramps, with or without nausea. ? You have about 6 or more contractions in 1 hour, even after you have had a glass of water and are resting. ? You have a low, dull backache that does not go away when you change your position. ? You have pain or pressure in your pelvis that comes and goes in a pattern. ? You have intestinal cramping or flu-like symptoms, with or without diarrhea.  ? You notice an increase or change in your vaginal discharge. Discharge may be heavy, mucus-like, watery, or streaked with blood. ? Your water breaks. · If you think you have  labor:  ? Drink 2 or 3 glasses of water or juice. Not drinking enough fluids can cause contractions. ? Stop what you are doing, and empty your bladder. Then lie down on your left side for at least 1 hour. ? While lying on your side, find your breast bone. Put your fingers in the soft spot just below it. Move your fingers down toward your belly button to find the top of your uterus. Check to see if it is tight. ? Contractions can be weak or strong. Record your contractions for an hour. Time a contraction from the start of one contraction to the start of the next one.  ? Single or several strong contractions without a pattern are called Barrow-Martino contractions. They are practice contractions but not the start of labor. They often stop if you change what you are doing. ? Call your doctor if you have regular contractions. Where can you learn more? Go to http://www.gray.com/  Enter Q081 in the search box to learn more about \"Weeks 26 to 30 of Your Pregnancy: Care Instructions. \"  Current as of: 2021               Content Version: 13.0  © 3200-6373 [employer]. Care instructions adapted under license by [employer] (which disclaims liability or warranty for this information).  If you have questions about a medical condition or this instruction, always ask your healthcare professional. [place] [address] any warranty or liability for your use of this information. Weeks 26 to 30 of Your Pregnancy: Care Instructions  Overview     You are now entering your last trimester of pregnancy. Your baby is growing quickly. Kaur Batista probably feel your baby moving around more often. Your doctor may ask you to count your baby's kicks. Your back may ache as your body gets used to your baby's size and length. If you haven't already had the Tdap shot during this pregnancy, talk to your doctor about getting it. It will help protect your  against pertussis infection. During this time, it's important to take care of yourself and pay attention to what your body needs. If you feel sexual, you can explore ways to be close with your partner that match your comfort and desire. Follow-up care is a key part of your treatment and safety. Be sure to make and go to all appointments, and call your doctor if you are having problems. It's also a good idea to know your test results and keep a list of the medicines you take. How can you care for yourself at home? Take it easy at work  · Take frequent breaks. If possible, stop working when you are tired, and rest during your lunch hour. · Take bathroom breaks every 2 hours. · Change positions often. If you sit for long periods, stand up and walk around. · When you stand for a long time, keep one foot on a low stool with your knee bent. After standing a lot, sit with your feet up. · Avoid fumes, chemicals, and tobacco smoke. Be sexual in your own way  · Having sex during pregnancy is okay, unless your doctor tells you not to. · You may be very interested in sex, or you may have no interest at all. · Your growing belly can make it hard to find a good position during intercourse. [place] and explore. · You may get cramps in your uterus when your partner touches your breasts.   · A back rub may relieve the backache or cramps that sometimes follow orgasm. Learn about  labor  · Watch for signs of  labor. You may be going into labor if:  ? You have menstrual-like cramps, with or without nausea. ? You have about 6 or more contractions in 1 hour, even after you have had a glass of water and are resting. ? You have a low, dull backache that does not go away when you change your position. ? You have pain or pressure in your pelvis that comes and goes in a pattern. ? You have intestinal cramping or flu-like symptoms, with or without diarrhea.  ? You notice an increase or change in your vaginal discharge. Discharge may be heavy, mucus-like, watery, or streaked with blood. ? Your water breaks. · If you think you have  labor:  ? Drink 2 or 3 glasses of water or juice. Not drinking enough fluids can cause contractions. ? Stop what you are doing, and empty your bladder. Then lie down on your left side for at least 1 hour. ? While lying on your side, find your breast bone. Put your fingers in the soft spot just below it. Move your fingers down toward your belly button to find the top of your uterus. Check to see if it is tight. ? Contractions can be weak or strong. Record your contractions for an hour. Time a contraction from the start of one contraction to the start of the next one.  ? Single or several strong contractions without a pattern are called Mendoza-Martino contractions. They are practice contractions but not the start of labor. They often stop if you change what you are doing. ? Call your doctor if you have regular contractions. Where can you learn more? Go to http://www.gray.com/  Enter N948 in the search box to learn more about \"Weeks 26 to 30 of Your Pregnancy: Care Instructions. \"  Current as of: 2021               Content Version: 13.0  © 9472-8563 [employer], [employer].    Care instructions adapted under license by [employer] (which disclaims liability or warranty for this information). If you have questions about a medical condition or this instruction, always ask your healthcare professional. [place] [address] any warranty or liability for your use of this information. Tdap (Tetanus, Diphtheria, Pertussis) Vaccine: What You Need to Know  Why get vaccinated? Tdap vaccine can prevent tetanus, diphtheria, and pertussis. Diphtheria and pertussis spread from person to person. Tetanus enters the body through cuts or wounds. · TETANUS (T) causes painful stiffening of the muscles. Tetanus can lead to serious health problems, including being unable to open the mouth, having trouble swallowing and breathing, or death. · DIPHTHERIA (D) can lead to difficulty breathing, heart failure, paralysis, or death. · PERTUSSIS (aP), also known as \"whooping cough,\" can cause uncontrollable, violent coughing which makes it hard to breathe, eat, or drink. Pertussis can be extremely serious in babies and young children, causing pneumonia, convulsions, brain damage, or death. In teens and adults, it can cause weight loss, loss of bladder control, passing out, and rib fractures from severe coughing. Tdap vaccine  Tdap is only for children 7 years and older, adolescents, and adults. Adolescents should receive a single dose of Tdap, preferably at age 6 or 15 years. Pregnant women should get a dose of Tdap during every pregnancy, to protect the  from pertussis. Infants are most at risk for severe, life threatening complications from pertussis. Adults who have never received Tdap should get a dose of Tdap. Also, adults should receive a booster dose every 10 years, or earlier in the case of a severe and dirty wound or burn. Booster doses can be either Tdap or Td (a different vaccine that protects against tetanus and diphtheria but not pertussis). Tdap may be given at the same time as other vaccines.   Talk with your health care provider  Tell your vaccine provider if the person getting the vaccine:  · Has had an allergic reaction after a previous dose of any vaccine that protects against tetanus, diphtheria, or pertussis, or has any severe, life threatening allergies. · Has had a coma, decreased level of consciousness, or prolonged seizures within 7 days after a previous dose of any pertussis vaccine (DTP, DTaP, or Tdap). · Has seizures or another nervous system problem. · Has ever had Guillain-Barré Syndrome (also called GBS). · Has had severe pain or swelling after a previous dose of any vaccine that protects against tetanus or diphtheria. In some cases, your health care provider may decide to postpone Tdap vaccination to a future visit. People with minor illnesses, such as a cold, may be vaccinated. People who are moderately or severely ill should usually wait until they recover before getting Tdap vaccine. Your health care provider can give you more information. Risks of a vaccine reaction  · Pain, redness, or swelling where the shot was given, mild fever, headache, feeling tired, and nausea, vomiting, diarrhea, or stomachache sometimes happen after Tdap vaccine. People sometimes faint after medical procedures, including vaccination. Tell your provider if you feel dizzy or have vision changes or ringing in the ears. As with any medicine, there is a very remote chance of a vaccine causing a severe allergic reaction, other serious injury, or death. What if there is a serious problem? An allergic reaction could occur after the vaccinated person leaves the clinic. If you see signs of a severe allergic reaction (hives, swelling of the face and throat, difficulty breathing, a fast heartbeat, dizziness, or weakness), call 9-1-1 and get the person to the nearest hospital.  For other signs that concern you, call your health care provider. Adverse reactions should be reported to the Vaccine Adverse Event Reporting System (VAERS).  Your health care provider will usually file this report, or you can do it yourself. Visit the VAERS website at www.vaers. [facility].gov or call 6-183.525.7284. VAERS is only for reporting reactions, and [facility] staff do not give medical advice. The National Vaccine Injury Compensation Program  The National Vaccine Injury Compensation Program (VICP) is a federal program that was created to compensate people who may have been injured by certain vaccines. Visit the VICP website at www.[employer].gov/vaccinecompensation or call 3-979.128.8738 to learn about the program and about filing a claim. There is a time limit to file a claim for compensation. How can I learn more? · Ask your health care provider. · Call your local or state health department. · Contact the Centers for Disease Control and Prevention (CDC):  ? Call 6-644.542.5615 (1-800-CDC-INFO) or  ? Visit CDC's website at www.cdc.gov/vaccines  Vaccine Information Statement (Interim)  Tdap (Tetanus, Diphtheria, Pertussis) Vaccine  04/01/2020  [address] [id number]  Department of Health and Human Services  Centers for Disease Control and Prevention  Many Vaccine Information Statements are available in [place]ese and other languages. See www.immunize.org/vis. Muchas hojas de información sobre vacunas están disponibles en español y en otros idiomas. Visite www.immunize.org/vis. Care instructions adapted under license by [employer] (which disclaims liability or warranty for this information). If you have questions about a medical condition or this instruction, always ask your healthcare professional. [place] [address] any warranty or liability for your use of this information.

## 2022-01-03 ENCOUNTER — ROUTINE PRENATAL (OUTPATIENT)
Dept: OBGYN CLINIC | Age: 22
End: 2022-01-03
Payer: MEDICAID

## 2022-01-03 VITALS — BODY MASS INDEX: 26.17 KG/M2 | SYSTOLIC BLOOD PRESSURE: 112 MMHG | WEIGHT: 134 LBS | DIASTOLIC BLOOD PRESSURE: 72 MMHG

## 2022-01-03 DIAGNOSIS — Z3A.18 18 WEEKS GESTATION OF PREGNANCY: ICD-10-CM

## 2022-01-03 DIAGNOSIS — Z23 ENCOUNTER FOR IMMUNIZATION: ICD-10-CM

## 2022-01-03 DIAGNOSIS — Z34.02 ENCOUNTER FOR SUPERVISION OF NORMAL FIRST PREGNANCY IN SECOND TRIMESTER: Primary | ICD-10-CM

## 2022-01-03 DIAGNOSIS — Z3A.27 27 WEEKS GESTATION OF PREGNANCY: ICD-10-CM

## 2022-01-03 LAB — ANTIBODY SCREEN, EXTERNAL: NEGATIVE

## 2022-01-03 PROCEDURE — 0502F SUBSEQUENT PRENATAL CARE: CPT | Performed by: ADVANCED PRACTICE MIDWIFE

## 2022-01-03 PROCEDURE — 90715 TDAP VACCINE 7 YRS/> IM: CPT | Performed by: ADVANCED PRACTICE MIDWIFE

## 2022-01-03 PROCEDURE — 90471 IMMUNIZATION ADMIN: CPT | Performed by: ADVANCED PRACTICE MIDWIFE

## 2022-01-05 LAB
BASOPHILS # BLD AUTO: 0 X10E3/UL (ref 0–0.2)
BASOPHILS NFR BLD AUTO: 0 %
BLD GP AB SCN SERPL QL: NEGATIVE
EOSINOPHIL # BLD AUTO: 0.1 X10E3/UL (ref 0–0.4)
EOSINOPHIL NFR BLD AUTO: 1 %
ERYTHROCYTE [DISTWIDTH] IN BLOOD BY AUTOMATED COUNT: 21.6 % (ref 11.7–15.4)
GLUCOSE 1H P 50 G GLC PO SERPL-MCNC: 181 MG/DL (ref 65–139)
HCT VFR BLD AUTO: 31.5 % (ref 34–46.6)
HGB BLD-MCNC: 9.8 G/DL (ref 11.1–15.9)
IMM GRANULOCYTES # BLD AUTO: 0.3 X10E3/UL (ref 0–0.1)
IMM GRANULOCYTES NFR BLD AUTO: 3 %
LYMPHOCYTES # BLD AUTO: 1.2 X10E3/UL (ref 0.7–3.1)
LYMPHOCYTES NFR BLD AUTO: 10 %
MCH RBC QN AUTO: 23.7 PG (ref 26.6–33)
MCHC RBC AUTO-ENTMCNC: 31.1 G/DL (ref 31.5–35.7)
MCV RBC AUTO: 76 FL (ref 79–97)
MONOCYTES # BLD AUTO: 0.6 X10E3/UL (ref 0.1–0.9)
MONOCYTES NFR BLD AUTO: 5 %
NEUTROPHILS # BLD AUTO: 10.3 X10E3/UL (ref 1.4–7)
NEUTROPHILS NFR BLD AUTO: 81 %
PLATELET # BLD AUTO: 219 X10E3/UL (ref 150–450)
RBC # BLD AUTO: 4.13 X10E6/UL (ref 3.77–5.28)
TREPONEMA PALLIDUM IGG+IGM AB [PRESENCE] IN SERUM OR PLASMA BY IMMUNOASSAY: NON REACTIVE
WBC # BLD AUTO: 12.4 X10E3/UL (ref 3.4–10.8)

## 2022-01-05 NOTE — PROGRESS NOTES
Chen needs a 3 hour test and iron supplements    Can you call her today and get her set up     Thank you   L-A

## 2022-01-13 ENCOUNTER — APPOINTMENT (OUTPATIENT)
Dept: VASCULAR SURGERY | Age: 22
End: 2022-01-13
Attending: ADVANCED PRACTICE MIDWIFE
Payer: MEDICAID

## 2022-01-13 ENCOUNTER — HOSPITAL ENCOUNTER (EMERGENCY)
Age: 22
Discharge: STILL ADMITTED OR ADMINISTRATIVE | End: 2022-01-13

## 2022-01-13 ENCOUNTER — HOSPITAL ENCOUNTER (EMERGENCY)
Age: 22
Discharge: HOME | End: 2022-01-13
Payer: MEDICAID

## 2022-01-13 VITALS
RESPIRATION RATE: 16 BRPM | DIASTOLIC BLOOD PRESSURE: 63 MMHG | TEMPERATURE: 99.2 F | SYSTOLIC BLOOD PRESSURE: 110 MMHG | HEART RATE: 121 BPM

## 2022-01-13 LAB
APPEARANCE UR: ABNORMAL
BACTERIA URNS QL MICRO: ABNORMAL /HPF
BILIRUB UR QL: NEGATIVE
CAOX CRY URNS QL MICRO: ABNORMAL
CLUE CELLS VAG QL WET PREP: NORMAL
COLOR UR: ABNORMAL
COVID-19 RAPID TEST, COVR: DETECTED
EPITH CASTS URNS QL MICRO: ABNORMAL /LPF
GLUCOSE UR STRIP.AUTO-MCNC: NEGATIVE MG/DL
HGB UR QL STRIP: NEGATIVE
KETONES UR QL STRIP.AUTO: ABNORMAL MG/DL
KOH PREP SPEC: NORMAL
LEUKOCYTE ESTERASE UR QL STRIP.AUTO: ABNORMAL
MUCOUS THREADS URNS QL MICRO: ABNORMAL /LPF
NITRITE UR QL STRIP.AUTO: NEGATIVE
PH UR STRIP: 6.5 [PH] (ref 5–8)
PROT UR STRIP-MCNC: ABNORMAL MG/DL
RBC #/AREA URNS HPF: ABNORMAL /HPF (ref 0–5)
SERVICE CMNT-IMP: NORMAL
SOURCE, COVRS: ABNORMAL
SP GR UR REFRACTOMETRY: 1.02 (ref 1–1.03)
T VAGINALIS VAG QL WET PREP: NORMAL
UA: UC IF INDICATED,UAUC: ABNORMAL
UROBILINOGEN UR QL STRIP.AUTO: 1 EU/DL (ref 0.2–1)
WBC URNS QL MICRO: ABNORMAL /HPF (ref 0–4)

## 2022-01-13 PROCEDURE — 74011250637 HC RX REV CODE- 250/637: Performed by: ADVANCED PRACTICE MIDWIFE

## 2022-01-13 PROCEDURE — 99285 EMERGENCY DEPT VISIT HI MDM: CPT

## 2022-01-13 PROCEDURE — 87635 SARS-COV-2 COVID-19 AMP PRB: CPT

## 2022-01-13 PROCEDURE — 75810000275 HC EMERGENCY DEPT VISIT NO LEVEL OF CARE

## 2022-01-13 PROCEDURE — 93970 EXTREMITY STUDY: CPT

## 2022-01-13 PROCEDURE — 87210 SMEAR WET MOUNT SALINE/INK: CPT

## 2022-01-13 PROCEDURE — 87086 URINE CULTURE/COLONY COUNT: CPT

## 2022-01-13 PROCEDURE — 81001 URINALYSIS AUTO W/SCOPE: CPT

## 2022-01-13 RX ORDER — ACETAMINOPHEN 500 MG
1000 TABLET ORAL
Status: COMPLETED | OUTPATIENT
Start: 2022-01-13 | End: 2022-01-13

## 2022-01-13 RX ORDER — NITROFURANTOIN 25; 75 MG/1; MG/1
100 CAPSULE ORAL 2 TIMES DAILY
Qty: 9 CAPSULE | Refills: 0 | Status: SHIPPED | OUTPATIENT
Start: 2022-01-13 | End: 2022-01-18

## 2022-01-13 RX ORDER — NITROFURANTOIN 25; 75 MG/1; MG/1
100 CAPSULE ORAL
Status: COMPLETED | OUTPATIENT
Start: 2022-01-13 | End: 2022-01-13

## 2022-01-13 RX ADMIN — ACETAMINOPHEN 1000 MG: 500 TABLET ORAL at 05:49

## 2022-01-13 RX ADMIN — NITROFURANTOIN (MONOHYDRATE/MACROCRYSTALS) 100 MG: 75; 25 CAPSULE ORAL at 08:00

## 2022-01-13 NOTE — PROGRESS NOTES
0730 Bedside and Verbal shift change report given to CHERRY Patel RN (oncoming nurse) by Britney Rdz RN (offgoing nurse).  Report included the following information SBAR, Intake/Output and MAR.       6003 Went over discharge instructions with patient and patient verbalized understanding

## 2022-01-13 NOTE — DISCHARGE INSTRUCTIONS
Patient Education     Patient Education        ??????? ???????????? 26 ???? 30 ?????????: ??????? ??????????? Weeks 26 to 30 of Your Pregnancy: Care Instructions  ??????? ??????? ???????????     ????? ?? ????? ???????????? ?????? ??????????? ?????????? ??????? ????? ????? ????? ???????? ?? ? ??????? ???????? ????? ????? ????? ?????: ????? ??????????? ??????? ???????? ???????? ????? ??????? ????????? ???? ???? ?????? ???? ???????????  ??????? ???? ????? ??????? ???? ? ????????? ???? ???? ?????? ??????? ??? ??????????  ??????? ?? ??????????? ??????  Tdap ??? ??????? ????????? ??? ???, ???? ??? ??????? ???????? ????? ??????????? ???????? ?????????? ???? ??????? ????? ??????? ???????? ??????? ??????? ??????? ???? ????? ???????  ?? ??? ??????, ??????? ????? ??????? ???? ? ????? ?????? ?? ??????? ?????? ????? ????????? ???????????? ?????? ???????? ??? ????? ??? ???, ????? ?????????? ???? ???? ???????? ??????? ????????? ???? ??????? ??? ? ???????? ??? ?????? ??????? ????? ??????? ????? ???? ???????? ????? ????? ?? ??????? ?????? ?????? ?????? ?????????? ?????? ??????????  ???-?? ?????? ??????? ????? ??? ????????? ????? ??? ??? ??? ????????????? ??? ??? ????? ??????? ????????? ???? ??? ???????? ????????? ??????? ? ??? ????? ????????? ?? ?????????? ????? ?????? ????????? ???? ????? ??? ????? ???? ????????? ???? ?????? ??? ?? ?????? ????? ???  ??????? ???? ????? ??????? ???? ???? ???????????  ????? ????? ????? ???? ????????  · ???????? ?????????? ????????? ????? ????, ????? ???? ????? ????? ???? ?????????? ? ????? ???? ????? ???? ??????????  · ???? 2 ??????? ?????? ?????????? ?????????  · ????? ????????? ???????? ?????????? ????? ???? ??????? ?????????? ???, ????????? ? ?????? ????????????  · ????? ???? ??????? ?????, ??????? ????? ???????? ???? ??????? ???? ?????? ??????????? ???? ??????? ?????????, ????? ?????? ???? ???? ??????????  · ????????, ??????? ????????? ? ?????????? ?????? ???? ???????????  ????? ??????? ????? ????????  · ??????? ???????? ???? ???????? ???????????? ?????? ??? ??????? ?????? ??? ??????  · ????? ???-????????? ????? ???? ?????????? ??? ???? ?? ???????? ???? ??? ???? ????? ????????  · ??????? ?????? ??????? ?????? ???? ????? ????? ?????? ?????? ????? ????? ???? ????? ????? ??????? ? ????????  · ??????? ?????????? ??????? ??????? ????? ??????? ????? ????????? ?????? ????? ???? ???????????  · ??? ???????? ??? ????? ?? ?????????? ???-????? ????? ????? ???????? ???? ????????  ???????? ??????? ???? ??????????  · ???????? ????????? ???????? ??????????? ????? ?????? ??? ?????????? ???:  ? ???????? ??????? ?????? ?? ??????? ????? ?????????-????? ??????? ?????  ? ??????? ?? ????? ???? ????????? ? ???? ????????? ??? ???????? 1 ??????? 6 ?? ?? ????? ??? ????????? ?????  ? ???????? ??, ?????? ?????? ???? ?? ??????? ????? ?????? ???????? ????? ????? ?? ???????  ? ???????? ????? ????? ????? ?? ??? ???? ?? ?? ??????? ???? ? ???? ?????  ? ???????? ????????? ???? ?? ???? ??? ???????? ?????? ?? ???? ????? ???????? ?????  ? ??????? ????? ??????? ???? ????? ?????? ?????? ?? ???????? ????????? ???? ???? ????? ???? ????, ??-?????, ????? ?? ????? ?????? ????????  ? ??????? ???? ??????? ??????  · ??? ???????? ???????? ??????? ? ????? ????? ???:  ? 2 ?? 3 ????? ???? ?? ??? ?????????? ???????? ??? ????????? ???????? ????????? ????????  ? ??????? ?? ????? ??????? ? ?????????? ??? ????????? ???? ?????????? ??????? ??????? 1 ????????? ????? ????? ???? ?????? ????????????  ? ?????? ???? ??????, ????? ???? ????? ????? ??????????? ????? ?????????? ?????? ???????? ???? ??? ?????? ??????????? ????? ????????? ???? ????? ????? ????? ?????????? ????? ??????? ??????????, ????? ? ?? ??? ??? ????? ???? ??????????  ? ????????? ????? ?? ??? ?????????? ????? ???????????? ?? ??????? ???? ?????? ?????????? ???? ???????? ??????? ???? ????? ???????? ????????????? ??? ???????????  ? ????? ?????? ??? ?? ??????? ??? ???????????? ????????? ????? ????????? ??????? ??????? ???????? ????????? ????? ?? ?????? ??????? ??????? ??? ??????? ?? ????? ??????? ? ??????? ???????? ???????? ??? ??????? ????? ??????????  ? ??? ???????? ?????? ????????? ???? ? ??? ????? ????????? ????????????  ??????? ?? ???? ????? ???????????  ???? ????????   [email or web address]  ???????? ????????? S999 ?????? ?? ????? ??? ?????? \"??????? ???????????? 26 ???? 30 ?????????: ??????? ???????????.\"  ?? ????? ???: 2021 06 16               ????????? ?????: 13.0  © 4489-9972 [employer]wise, Inc[employer]. ???????? ?????????????? ?????? ???????? ???????? ??????? ????????? ?????? ????? ??????? ??? ???????? ?? ???????? ?????? ?? ?? ?????????? ?????? ????????? ??? ???, ???? ????? ????????? ?????? ????? ????????? ??????????? [employer], ?? ??????? ?? ????????? ???????? ???? ???? ??? ???????? ?? ?????????? ???????? ?????? Learning About COVID-19 and Flu Symptoms  How can you tell COVID-19 from the flu? Patient Education              COVID-19 ???? ???????? ???? ????? ????? ????? ??????   Learning How To Care for Someone Who Has COVID-19  ???? ?????????? ???????  COVID-19 ???? ??????? ???????? ??????? ? ???? ????????? ???? ???????? ??????? ?????? ???? ????? ???? ??????? ????? ????????? ??? ???? ?????????? ???? ??????? ???? ????  · ?????????? ????? ??????????   ??????? ?????????? ?????, ???? ? ???????????? ????? ?????? ???????? ?????????? ???????? ???? ???? ? ???????? ?????? ??? ?????? ??????????? ???? ??????? ???? ???? ????? ??????????  ????? ?? ???? ???????????? ???????? (????? ????????)? ???? ???????? ????? ??????? ??? ????? ???? ????? ?????? ??? ??????????? ????????? ? ????? ??????????  · ????????? ?? ????? ? ?? ??? ????????? ???????????   ??????? ?? ??? ?????? ???? ????? ?????????? ??????? ?????? ???? ???? (???????? ????, ??? ????? ???? ????)? ?? ????? ??????? ???????? ?????????? ??? ?????????? ???????? ?? ????? ??? ???????? ????? ???????  ????????? ?????????? ???? ??? ????? ???? ?? ?????????? 911  ?? ???????? ??????? ??????????:  ? ??? ??????? ?????? ?????? ?? ???????????  ? ????? ?????? ??????? ????? ?? ?????  ? ???? ?? ?????? ????? ??????? ???????  ? ????? ?? ?? ???????? ????? ???? ???  ???? ???????? ???? ?????? ???? ? ?????????? ??????? ?????? ????? ???????? ?????? ??????? ??????? ???????? ??????? 65 ?????????? ????, ???????? ????? ???? ?? ??, ???? ???, ?????? ?? ?????????? ????????? ?????? ????? ????????? ?????? ???? ???????? ???? ???????????? ????????? ??? ??????????  · ????? ? ????? ??????? ??????????   ????? ?? ?????????? ????? ????????? ????? ????, ??????? ???????? ??? ?? ????????? ????? ???????? ????? ?????????  ? ?????? ?????????? ????? ????? ??? ?????? ???? ???????????  § ?????????? ???? ?????? ??????????? ??????? ?????????, ????????? ???????? ????? ?????? ?????? ???? ?????????  § ???????? ??????? ???? ???? ????? ?????????? ??????????? ???? ?????--? ?????????--??? ???????? ???? ????? ???????????  § ?????????? ???? ??????? ?????? ????? ?????? ?????? ?????? ???? ????? ?????????? ???? ????????? ?????? ???? ????? ?? ???????? ????? ???? ?????? (???????? ????, ??????? ?????) ?????? ???????? ?????? ???????? ?????? ?????????? ??? ????? ???? ??????, ????? ????????? ?????? ?????? ???? ???????????  § ????????? ?????????? ???? ??????????? ???? ?????, ??, ?????? ? ?????? ?????? ????????  ? ????? ??? ???????? ? ????????? ????????? ????? ? ???? ?????? ????????? ? ??????? 20 ??????? ???? ??????????? ????? ??? ????? ?????? ????????? ?????? ????? ?? ???????? ????? ??????? ?????? ????? ???? ???????????? ?????? ????? ? ???? ?????? ?????, ??????? 60% ??????? ???? ??????? ??????????? ?????? ??????????  ? ????? ???, ??? ? ???? ??????????  ? ????????? ???? ???? ?????? ????? ????????? ?????? ????????? ??????? ??????? ???? ????? ??? ??????? ??????? ????? ?????????, ???????? ????? ???? ??????? ?????????? ???? ?????? ????? ?????????? ? ??????? ????? ?????? ????? ??? ????????? ??????? ???????? ???? ???????? ???????? ?????? ?????? ???????? ???????? ? ?????????? ?????????? ????????????  ? ???? ????? ???????? ???? ??? ? ?????? ????????? ?????????? ?????? ??? ?????????? ???? ?????? ????????, ???????? ?????, ??????, ??????? ? ????? ??????????? ?????? ???????? ????? ??????????????? ?? ???? ??????? ??????? ??? ?????? ?????????? (?????? ????????????? ????? ??????????) ?????? ????????? ????? ???? ????, ????????? ???? ??? ???? ??????????? ???? ???????????  ? ????? ???? ????? ?????????? ????? ?????????? ?????? ? ?????????? ??????? ???? ????? ????, ????????? ??? ?? ?????????? ????? ????????? ?????????  ??????? ?? ???? ????? ???????????  ???? ????????   [email or web address]  ???????? ????????? A137 ?????? ?? ????? ??? ?????? \"COVID-19 ???? ???????? ???? ????? ????? ????? ??????.\"  ?? ????? ???: 2021 03 26               ????????? ?????: 13.0  © 3579-9563 [employer]wise, Incorporated. ???????? ?????????????? ?????? ???????? ???????? ??????? ????????? ?????? ????? ??????? ??? ???????? ?? ???????? ?????? ?? ?? ?????????? ?????? ????????? ??? ???, ???? ????? ????????? ?????? ????? ????????? ??????????? [employer]wise, Inc[employer], ?? ??????? ?? ????????? ???????? ???? ???? ??? ???????? ?? ?????????? ???????? ??????       COVID-19 and the flu have similar symptoms. The two can be hard to tell apart. The only way to know for sure which illness you have is to be tested. Since the symptoms are so alike, it makes sense to act as if you have COVID-19 until your test results come back. This means staying home and limiting contact with people in your home. You'll need to wash your hands often and disinfect surfaces that you touch. And be sure to wear a mask when you're around other people. This is also good advice if you think you have the flu.   COVID-19 and the flu have these symptoms in common:  · Fever or chills  · Cough  · Shortness of breath  · Fatigue (tiredness)  · Sore throat  · Runny or stuffy nose  · Muscle and body aches  · Headache  · Vomiting and diarrhea (more common in children than adults)  COVID-19 has another symptom that also may occur:  · New loss of taste or smell  COVID-19 symptoms may appear from 2 to 14 days after infection. Flu symptoms usually appear 1 to 4 days after infection. Why should you get a flu shot during the COVID-19 pandemic? It's important to get your yearly flu vaccine. Both the flu and COVID-19 can be active at the same time. You can get sick with both infections at once. And having both may make you more sick than getting just one. The flu vaccine won't protect you from COVID-19. But it can help prevent the flu or reduce its symptoms. If fewer people get very ill with the flu, this will help free up medical resources that are needed for COVID-19 patients. Where can you learn more? Go to http://www.triplett.com/  Enter C123 in the search box to learn more about \"Learning About COVID-19 and Flu Symptoms. \"  Current as of: March 26, 2021               Content Version: 13.0  © 7816-5979 Healthwise, Incorporated. Care instructions adapted under license by [employer] (which disclaims liability or warranty for this information). If you have questions about a medical condition or this instruction, always ask your healthcare professional. [place] [address] any warranty or liability for your use of this information.

## 2022-01-13 NOTE — PROGRESS NOTES
8853 - patient arrived to [facility] 3 complaining of leg pain, headache and mild abdominal pain. Denies any COVID exposure. 0436 - PRINCE Rico at bedside to assess, orders received for 1000mg Tylenol, UA, doppler study and COVID test.    4728 - Bedside and Verbal shift change report given to CHERRY Mcfadden RN (oncoming nurse) by GURPREET Rdz RN (offgoing nurse). Report included the following information SBAR, MAR and Recent Results.

## 2022-01-13 NOTE — ED PROVIDER NOTES
Brian Matos is a 23 yo  at 29w1d with an EMERALD of 3/30/22. She presents to the [facility] for multiple complaints. Pt speaks Yudi Saravias phone used for interview and exam. Pt reports a mild headache in forehead area. Reports it started yesterday morning and has not gone away since. Has not taken any medication to help. Also reports abd pain in middle of lower abd that also started yesterday morning, it comes and goes and is also mild in nature. Denies dysuria, vaginal discharge and vaginal bleeding. Pt also reports severe bilateral lower ext pain. Reports it woke her up at 0300 this morning and she has been unable to sleep since. Denies redness and swelling. Denies other body aches, fever, chills or cough. Reports decreased fetal movement since yesterday, has felt some movement this morning, but not her normal.     Prenatal care has been received at [facility] with the KEHINDEMs. From pregnancy problem list:    Provider: Cheko  G1 P 0  EDC by 2nd trimester Sono  Pertinents:  Late to care  IOB labs: Anemic, to start Vitron C TID, UTI---> KAE- resolved  Genetic Screening: Panorama, Horizen  Anatomy: Female, normal janny, Ant Placenta, 3VC  GTT: 181- NEEDS 3hour  Flu:  TDAP: 01/3/22  Rhogam:  Third Tri Labs: 9. - needs iron  GBS:  COVID:    Pain mgmt. in labor:  Feeding:  Circ:  Social:  Pt very stoic and quiet       The history is provided by the patient. Abdominal Pain   This is a new problem. The current episode started 12 to 24 hours ago. The problem occurs constantly. The problem has not changed since onset. Associated with: headache, leg pain. The pain is located in the suprapubic region. The pain is at a severity of 3/10. The pain is mild. Associated symptoms include headaches and myalgias. Nothing worsens the pain. The pain is relieved by nothing. Headache   This is a new problem. The current episode started 12 to 24 hours ago. The problem occurs constantly. The problem has not changed since onset. The headache is aggravated by nothing. The pain is located in the frontal region. The quality of the pain is described as sharp. The pain is at a severity of 3/10. The pain is mild. Pertinent negatives include no shortness of breath. She has tried nothing for the symptoms. Leg Pain   This is a new problem. The current episode started 1 to 2 hours ago. The problem occurs constantly. The problem has not changed since onset. The pain is present in the left lower leg and right lower leg. The quality of the pain is described as aching. The pain is at a severity of 10/10. The pain is severe. She has tried nothing for the symptoms. Past Medical History:   Diagnosis Date    Anemia     Headache        No past surgical history on file. Family History:   Problem Relation Age of Onset    No Known Problems Mother     Hypertension Father        Social History     Socioeconomic History    Marital status: SINGLE     Spouse name: Not on file    Number of children: Not on file    Years of education: Not on file    Highest education level: Not on file   Occupational History    Not on file   Tobacco Use    Smoking status: Never Smoker    Smokeless tobacco: Never Used   Substance and Sexual Activity    Alcohol use: No    Drug use: No    Sexual activity: Yes     Partners: Male     Birth control/protection: None   Other Topics Concern    Not on file   Social History Narrative    Not on file     Social Determinants of Health     Financial Resource Strain:     Difficulty of Paying Living Expenses: Not on file   Food Insecurity:     Worried About Running Out of Food in the Last Year: Not on file    Rosemary of Food in the Last Year: Not on file   Transportation Needs:     Lack of Transportation (Medical): Not on file    Lack of Transportation (Non-Medical):  Not on file   Physical Activity:     Days of Exercise per Week: Not on file    Minutes of Exercise per Session: Not on file   Stress:     Feeling of Stress : Not on file   Social Connections:     Frequency of Communication with Friends and Family: Not on file    Frequency of Social Gatherings with Friends and Family: Not on file    Attends Orthodoxy Services: Not on file    Active Member of Clubs or Organizations: Not on file    Attends Club or Organization Meetings: Not on file    Marital Status: Not on file   Intimate Partner Violence:     Fear of Current or Ex-Partner: Not on file    Emotionally Abused: Not on file    Physically Abused: Not on file    Sexually Abused: Not on file   Housing Stability:     Unable to Pay for Housing in the Last Year: Not on file    Number of [place] in the Last Year: Not on file    Unstable Housing in the Last Year: Not on file         ALLERGIES: Patient has no known allergies. Review of Systems   Constitutional: Negative. HENT: Negative. Eyes: Negative. Respiratory: Negative for shortness of breath. Cardiovascular: Negative. Gastrointestinal: Positive for abdominal pain. Endocrine: Negative. Genitourinary: Negative. Musculoskeletal: Positive for myalgias. Skin: Negative. Allergic/Immunologic: Negative. Neurological: Positive for headaches. Hematological: Negative. Psychiatric/Behavioral: Negative. Vitals:    01/13/22 0431   BP: 110/63   Pulse: (!) 121   Resp: 16   Temp: 99.2 °F (37.3 °C)            Physical Exam  Vitals and nursing note reviewed. Exam conducted with a chaperone present. Constitutional:       Appearance: She is well-developed and normal weight. She is not ill-appearing. HENT:      Head: Normocephalic. Mouth/Throat:      Mouth: Mucous membranes are moist.      Pharynx: Oropharynx is clear. Eyes:      Extraocular Movements: Extraocular movements intact. Cardiovascular:      Rate and Rhythm: Normal rate and regular rhythm. Heart sounds: Normal heart sounds.    Pulmonary:      Effort: Pulmonary effort is normal.      Breath sounds: Normal breath sounds. Abdominal:      Palpations: Abdomen is soft. Tenderness: There is no abdominal tenderness. There is no guarding or rebound. Comments: Gravid, soft   Genitourinary:     Comments: SVE: Closed/thick/high  Musculoskeletal:      Right lower leg: No swelling or tenderness. No edema. Left lower leg: Normal. No swelling or tenderness. No edema. Skin:     General: Skin is warm and dry. Capillary Refill: Capillary refill takes less than 2 seconds. Neurological:      General: No focal deficit present. Mental Status: She is alert and oriented to person, place, and time.    Psychiatric:         Mood and Affect: Mood normal.         Behavior: Behavior normal.      NST: Monitored for 20 minutes, reactive, cat 1, baseline 155, positive accels, no decels, moderate variability, no ctx, uterus soft    Patient Vitals for the past 4 hrs:   Temp Pulse Resp BP   01/13/22 0431 99.2 °F (37.3 °C) (!) 121 16 110/63      Recent Results (from the past 12 hour(s))   COVID-19 RAPID TEST    Collection Time: 01/13/22  5:28 AM   Result Value Ref Range    Specimen source Nasopharyngeal      COVID-19 rapid test Detected (A) NOTD     URINALYSIS W/ REFLEX CULTURE    Collection Time: 01/13/22  5:28 AM    Specimen: Urine   Result Value Ref Range    Color YELLOW/STRAW      Appearance CLOUDY (A) CLEAR      Specific gravity 1.022 1.003 - 1.030      pH (UA) 6.5 5.0 - 8.0      Protein TRACE (A) NEG mg/dL    Glucose Negative NEG mg/dL    Ketone TRACE (A) NEG mg/dL    Bilirubin Negative NEG      Blood Negative NEG      Urobilinogen 1.0 0.2 - 1.0 EU/dL    Nitrites Negative NEG      Leukocyte Esterase SMALL (A) NEG      WBC 10-20 0 - 4 /hpf    RBC 0-5 0 - 5 /hpf    Epithelial cells MANY (A) FEW /lpf    Bacteria 2+ (A) NEG /hpf    UA:UC IF INDICATED URINE CULTURE ORDERED (A) CNI      Mucus 1+ (A) NEG /lpf    CA Oxalate crystals 3+ (A) NEG   WET PREP    Collection Time: 01/13/22  5:28 AM    Specimen: Miscellaneous sample Result Value Ref Range    Clue cells CLUE CELLS ABSENT      Wet prep NO TRICHOMONAS SEEN     KOH, OTHER SOURCES    Collection Time: 01/13/22  5:28 AM    Specimen: Vaginal Specimen; Other   Result Value Ref Range    Special Requests: NO SPECIAL REQUESTS      KOH NO YEAST SEEN         MDM  Number of Diagnoses or Management Options     Amount and/or Complexity of Data Reviewed  Clinical lab tests: (ua with reflex culture, wet prep/koh)  Decide to obtain previous medical records or to obtain history from someone other than the patient: yes  Review and summarize past medical records: yes  Independent visualization of images, tracings, or specimens: yes (NST)    Risk of Complications, Morbidity, and/or Mortality  Presenting problems: moderate  Diagnostic procedures: moderate  Management options: moderate    Critical Care  Total time providing critical care: > 105 minutes    Patient Progress  Patient progress: stable    ED Course as of 01/13/22 0747   Thu Jan 13, 2022   0503 Admit to [facility]  NST  History taken with interpretor   Wet prep/koh  SVE  Ua with reflex culture  COVID test  Bilateral venus dopplers to rule out DVT due to amount of pain pt in [LA]   0737 COVID positive  UTI- macrobid, first dose now, rx sent to pharmacy to complete treatment [LA]   0746 Negative dopplers, no DVT  Discharge home, return to [facility] with any concerns, keep next routine ob appt.    Reviewed PTL, VB, LOF and fetal movement precautions [LA]      ED Course User Index  [LA] Guille Mcleod CNM

## 2022-01-14 LAB
BACTERIA SPEC CULT: NORMAL
CC UR VC: NORMAL
SERVICE CMNT-IMP: NORMAL

## 2022-02-02 DIAGNOSIS — O24.419 GESTATIONAL DIABETES MELLITUS (GDM), ANTEPARTUM, GESTATIONAL DIABETES METHOD OF CONTROL UNSPECIFIED: Primary | ICD-10-CM

## 2022-02-21 ENCOUNTER — ROUTINE PRENATAL (OUTPATIENT)
Dept: OBGYN CLINIC | Age: 22
End: 2022-02-21
Payer: MEDICAID

## 2022-02-21 VITALS
DIASTOLIC BLOOD PRESSURE: 102 MMHG | WEIGHT: 145 LBS | BODY MASS INDEX: 28.47 KG/M2 | HEIGHT: 60 IN | SYSTOLIC BLOOD PRESSURE: 152 MMHG

## 2022-02-21 DIAGNOSIS — Z3A.18 18 WEEKS GESTATION OF PREGNANCY: ICD-10-CM

## 2022-02-21 DIAGNOSIS — O26.86 PUPPP (PRURITIC URTICARIAL PAPULES AND PLAQUES OF PREGNANCY): Primary | ICD-10-CM

## 2022-02-21 PROCEDURE — 0502F SUBSEQUENT PRENATAL CARE: CPT | Performed by: ADVANCED PRACTICE MIDWIFE

## 2022-02-21 RX ORDER — CETIRIZINE HCL 10 MG
10 TABLET ORAL 2 TIMES DAILY
Qty: 90 TABLET | Refills: 2 | Status: SHIPPED | OUTPATIENT
Start: 2022-02-21 | End: 2022-02-23

## 2022-02-21 RX ORDER — FAMOTIDINE 20 MG/1
20 TABLET, FILM COATED ORAL 2 TIMES DAILY
Qty: 90 TABLET | Refills: 1 | Status: SHIPPED | OUTPATIENT
Start: 2022-02-21 | End: 2022-02-23

## 2022-02-21 NOTE — PATIENT INSTRUCTIONS
Weeks 34 to 36 of Your Pregnancy: Care Instructions  Overview     By now, your baby and your belly have grown quite large. It's almost time to give birth! Your baby's lungs are almost ready to breathe air. The skull bones are firm enough to protect your baby's head, but soft enough to move down through the birth canal.  You may be feeling excited and happy at times--but also anxious or scared. You might wonder how you'll know if you're in labor or what to expect during labor. Try to be open and flexible in your expectations of the birth. Because each birth is different, there's no way to know exactly what childbirth will be like for you. Talk to your doctor or midwife about any concerns you have. If you haven't already had the Tdap shot during this pregnancy, talk to your doctor about getting it. It will help protect your  against pertussis infection. In the 36th week, you'll probably have a test for group B streptococcus (GBS). GBS is a common type of bacteria that can live in the vagina and rectum. It can make your baby sick after birth. If you test positive, you will get antibiotics during labor. The medicine will help keep your baby from getting the bacteria. Follow-up care is a key part of your treatment and safety. Be sure to make and go to all appointments, and call your doctor if you are having problems. It's also a good idea to know your test results and keep a list of the medicines you take. How can you care for yourself at home? Learn about pain relief choices  · Pain is different for everyone. Talk with your doctor about your feelings about pain. · You can choose from several types of pain relief. These include medicine, breathing techniques, and comfort measures. You can use more than one option. · If you choose to have pain medicine during labor, talk to your doctor about your options. Some medicines lower anxiety and help with some of the pain.  Others make your lower body numb so that you won't feel pain. · Be sure to tell your doctor about your pain medicine choice before you start labor or very early in your labor. You may be able to change your mind as labor progresses. Labor and delivery  · The first stage of labor has three parts: early, active, and transition. ? It's common to have early labor at home. You can stay busy or rest, eat light snacks, drink clear fluids, and start counting contractions. ? When talking during a contraction gets hard, you may be moving to active labor. During active labor, you should head for the hospital if you aren't there already. ? You are in active labor when contractions come every 3 to 4 minutes and last about 60 seconds. Your cervix is opening more rapidly. ? If your water breaks, contractions will come faster and stronger. ? During transition, your cervix is stretching, and contractions are coming more rapidly. ? You may want to push, but your cervix might not be ready. Your doctor will tell you when to push. · The second stage starts when your cervix is completely opened and you are ready to push. ? Contractions are very strong to push the baby down the birth canal.  ? You will probably feel the urge to push. You may feel like you need to have a bowel movement. ? You may be coached to push with contractions. These contractions will be very strong, but you won't have them as often. You can get a little rest between contractions. ? One last push, and your baby is born. · The third stage is when a few more contractions push out the placenta. This may take 30 minutes or less. Where can you learn more? Go to http://www.gray.com/  Enter B912 in the search box to learn more about \"Weeks 34 to 36 of Your Pregnancy: Care Instructions. \"  Current as of: June 16, 2021               Content Version: 13.0  © 6396-5514 [employer].    Care instructions adapted under license by [facility] (which disclaims liability or warranty for this information). If you have questions about a medical condition or this instruction, always ask your healthcare professional. [place] [address] any warranty or liability for your use of this information.

## 2022-02-21 NOTE — PROGRESS NOTES
States she is having heart burn but has not tried anything for it.  Reports GFM    Rash on face neck and abdomin started this month - pt is a poor historian as to exactly when the rash started states she feels rash started on her face then went to arms and then trunk and abdomen- pimple like papules on pt forehead and back of neck in a sparse non se metrical,  tiny fine papules over her abdomen   -  Run TORCH titiers   varicella IGG/IGM - immune at prenatal visit    no one at home is ill, she is unaware if she was exposed to anyone that was sick    Needs three hour GTT and PreE labs   Denies head ache having some heart burn centrally does not complain of RUQ pain     Left prior to getting BP rechecked, did not make apt,  states she handed them the  phone and left the office - will attempt to call pt with  line    number [phone]- pt did not answer phone  left a message as directed by MONISHA to please come to office tomorrow at 0830 tomorrow morning  Attempted all 3 numbers - left messages on home and mobile-  Called her emergency contact number - mail box is full

## 2022-02-22 ENCOUNTER — TELEPHONE (OUTPATIENT)
Dept: OBGYN CLINIC | Age: 22
End: 2022-02-22

## 2022-02-22 ENCOUNTER — ROUTINE PRENATAL (OUTPATIENT)
Dept: OBGYN CLINIC | Age: 22
End: 2022-02-22
Payer: MEDICAID

## 2022-02-22 ENCOUNTER — HOSPITAL ENCOUNTER (INPATIENT)
Age: 22
LOS: 1 days | Discharge: HOME | DRG: 566 | End: 2022-02-23
Attending: OBSTETRICS & GYNECOLOGY | Admitting: ADVANCED PRACTICE MIDWIFE
Payer: MEDICAID

## 2022-02-22 ENCOUNTER — HOSPITAL ENCOUNTER (EMERGENCY)
Age: 22
Discharge: STILL ADMITTED OR ADMINISTRATIVE | End: 2022-02-22
Attending: EMERGENCY MEDICINE

## 2022-02-22 VITALS
WEIGHT: 145 LBS | DIASTOLIC BLOOD PRESSURE: 100 MMHG | SYSTOLIC BLOOD PRESSURE: 132 MMHG | HEIGHT: 60 IN | BODY MASS INDEX: 28.47 KG/M2

## 2022-02-22 DIAGNOSIS — Z34.03 ENCOUNTER FOR SUPERVISION OF NORMAL FIRST PREGNANCY IN THIRD TRIMESTER: Primary | ICD-10-CM

## 2022-02-22 DIAGNOSIS — O13.3 GESTATIONAL HYPERTENSION, THIRD TRIMESTER: ICD-10-CM

## 2022-02-22 PROBLEM — O13.9 GESTATIONAL HYPERTENSION: Status: ACTIVE | Noted: 2022-02-22

## 2022-02-22 PROBLEM — Z3A.34 34 WEEKS GESTATION OF PREGNANCY: Status: ACTIVE | Noted: 2022-02-22

## 2022-02-22 LAB
ALBUMIN SERPL-MCNC: 2.5 G/DL (ref 3.5–5)
ALBUMIN/GLOB SERPL: 0.6 {RATIO} (ref 1.1–2.2)
ALP SERPL-CCNC: 278 U/L (ref 45–117)
ALT SERPL-CCNC: 15 U/L (ref 12–78)
ANION GAP SERPL CALC-SCNC: 6 MMOL/L (ref 5–15)
AST SERPL-CCNC: 51 U/L (ref 15–37)
BILIRUB SERPL-MCNC: 0.3 MG/DL (ref 0.2–1)
BUN SERPL-MCNC: 5 MG/DL (ref 6–20)
BUN/CREAT SERPL: 9 (ref 12–20)
CALCIUM SERPL-MCNC: 8.6 MG/DL (ref 8.5–10.1)
CHLORIDE SERPL-SCNC: 113 MMOL/L (ref 97–108)
CMV IGG SERPL IA-ACNC: <0.6 U/ML (ref 0–0.59)
CMV IGM SERPL IA-ACNC: <30 AU/ML (ref 0–29.9)
CO2 SERPL-SCNC: 18 MMOL/L (ref 21–32)
COMMENT, 096659: NORMAL
CREAT SERPL-MCNC: 0.53 MG/DL (ref 0.55–1.02)
CREAT UR-MCNC: 22.4 MG/DL
ERYTHROCYTE [DISTWIDTH] IN BLOOD BY AUTOMATED COUNT: 24.9 % (ref 11.5–14.5)
EST. AVERAGE GLUCOSE BLD GHB EST-MCNC: 103 MG/DL
GLOBULIN SER CALC-MCNC: 4 G/DL (ref 2–4)
GLUCOSE SERPL-MCNC: 74 MG/DL (ref 65–100)
GRBS, EXTERNAL: NEGATIVE
HBA1C MFR BLD: 5.2 % (ref 4–5.6)
HCT VFR BLD AUTO: 36.5 % (ref 35–47)
HGB BLD-MCNC: 11.8 G/DL (ref 11.5–16)
HSV1 IGG SER IA-ACNC: 20.3 INDEX (ref 0–0.9)
HSV1+2 IGM SER IA-ACNC: <0.91 RATIO (ref 0–0.9)
HSV2 IGG SER IA-ACNC: 7.7 INDEX (ref 0–0.9)
LDH SERPL L TO P-CCNC: 486 U/L (ref 81–246)
MCH RBC QN AUTO: 26.4 PG (ref 26–34)
MCHC RBC AUTO-ENTMCNC: 32.3 G/DL (ref 30–36.5)
MCV RBC AUTO: 81.7 FL (ref 80–99)
NRBC # BLD: 0 K/UL (ref 0–0.01)
NRBC BLD-RTO: 0 PER 100 WBC
PLATELET # BLD AUTO: 207 K/UL (ref 150–400)
PMV BLD AUTO: 10.9 FL (ref 8.9–12.9)
POTASSIUM SERPL-SCNC: 5.1 MMOL/L (ref 3.5–5.1)
PROT SERPL-MCNC: 6.5 G/DL (ref 6.4–8.2)
PROT UR-MCNC: 13 MG/DL (ref 0–11.9)
PROT/CREAT UR-RTO: 0.6
RBC # BLD AUTO: 4.47 M/UL (ref 3.8–5.2)
RUBV IGG SERPL IA-ACNC: 3.68 INDEX
RUBV IGM SER IA-ACNC: <20 AU/ML (ref 0–19.9)
SODIUM SERPL-SCNC: 137 MMOL/L (ref 136–145)
T GONDII IGG SERPL IA-ACNC: <3 IU/ML (ref 0–7.1)
T GONDII IGM SER IA-ACNC: <3 AU/ML (ref 0–7.9)
URATE SERPL-MCNC: 4.4 MG/DL (ref 2.6–6)
VZV IGG SER IA-ACNC: 567 INDEX
VZV IGM SER IA-ACNC: <0.91 INDEX (ref 0–0.9)
WBC # BLD AUTO: 9.7 K/UL (ref 3.6–11)

## 2022-02-22 PROCEDURE — 80053 COMPREHEN METABOLIC PANEL: CPT

## 2022-02-22 PROCEDURE — 0502F SUBSEQUENT PRENATAL CARE: CPT | Performed by: ADVANCED PRACTICE MIDWIFE

## 2022-02-22 PROCEDURE — 84550 ASSAY OF BLOOD/URIC ACID: CPT

## 2022-02-22 PROCEDURE — G0378 HOSPITAL OBSERVATION PER HR: HCPCS

## 2022-02-22 PROCEDURE — 74011250636 HC RX REV CODE- 250/636: Performed by: ADVANCED PRACTICE MIDWIFE

## 2022-02-22 PROCEDURE — 74011000250 HC RX REV CODE- 250: Performed by: ADVANCED PRACTICE MIDWIFE

## 2022-02-22 PROCEDURE — 87081 CULTURE SCREEN ONLY: CPT

## 2022-02-22 PROCEDURE — 85027 COMPLETE CBC AUTOMATED: CPT

## 2022-02-22 PROCEDURE — 83036 HEMOGLOBIN GLYCOSYLATED A1C: CPT

## 2022-02-22 PROCEDURE — 83615 LACTATE (LD) (LDH) ENZYME: CPT

## 2022-02-22 PROCEDURE — 36415 COLL VENOUS BLD VENIPUNCTURE: CPT

## 2022-02-22 PROCEDURE — 65410000002 HC RM PRIVATE OB

## 2022-02-22 PROCEDURE — 84156 ASSAY OF PROTEIN URINE: CPT

## 2022-02-22 RX ORDER — FOLIC ACID/MULTIVIT,IRON,MINER 0.4MG-18MG
1 TABLET ORAL DAILY
Status: DISCONTINUED | OUTPATIENT
Start: 2022-02-23 | End: 2022-02-23 | Stop reason: HOSPADM

## 2022-02-22 RX ORDER — ACETAMINOPHEN 325 MG/1
650 TABLET ORAL
Status: DISCONTINUED | OUTPATIENT
Start: 2022-02-22 | End: 2022-02-23 | Stop reason: HOSPADM

## 2022-02-22 RX ORDER — SODIUM CHLORIDE 0.9 % (FLUSH) 0.9 %
5-40 SYRINGE (ML) INJECTION EVERY 8 HOURS
Status: DISCONTINUED | OUTPATIENT
Start: 2022-02-22 | End: 2022-02-23 | Stop reason: HOSPADM

## 2022-02-22 RX ORDER — ONDANSETRON 4 MG/1
4 TABLET, ORALLY DISINTEGRATING ORAL
Status: DISCONTINUED | OUTPATIENT
Start: 2022-02-22 | End: 2022-02-23 | Stop reason: HOSPADM

## 2022-02-22 RX ORDER — SODIUM CHLORIDE 0.9 % (FLUSH) 0.9 %
5-40 SYRINGE (ML) INJECTION AS NEEDED
Status: DISCONTINUED | OUTPATIENT
Start: 2022-02-22 | End: 2022-02-23 | Stop reason: HOSPADM

## 2022-02-22 RX ORDER — BETAMETHASONE SODIUM PHOSPHATE AND BETAMETHASONE ACETATE 3; 3 MG/ML; MG/ML
12 INJECTION, SUSPENSION INTRA-ARTICULAR; INTRALESIONAL; INTRAMUSCULAR; SOFT TISSUE EVERY 24 HOURS
Status: COMPLETED | OUTPATIENT
Start: 2022-02-22 | End: 2022-02-23

## 2022-02-22 RX ADMIN — SODIUM CHLORIDE, PRESERVATIVE FREE 10 ML: 5 INJECTION INTRAVENOUS at 21:45

## 2022-02-22 RX ADMIN — SODIUM CHLORIDE, PRESERVATIVE FREE 10 ML: 5 INJECTION INTRAVENOUS at 14:00

## 2022-02-22 RX ADMIN — BETAMETHASONE SODIUM PHOSPHATE AND BETAMETHASONE ACETATE 12 MG: 3; 3 INJECTION, SUSPENSION INTRA-ARTICULAR; INTRALESIONAL; INTRAMUSCULAR at 13:07

## 2022-02-22 NOTE — PROGRESS NOTES
Returned to office per L-A for BP check and labs. Needs 3hr GTT  Blood pressure elevated today as well - BPP in office with growth then pt to go to L&D for serial blood pressures and lab work. Some mild HUMPHREY edema     A SINGLE VERTEX 34W6D IUP IS SEEN. FETAL CARDIAC MOTION OBSERVED. LIMITED ANATOMY WAS VISUALIZED AND APPEARS WNL. EFW= 5 LB 1OZ ( 27 %) AC APPEARS LESS THAN 10%. BPP=8/8  SANTA= 11.7 CM  PLACENTA APPEAR WITHIN NORMAL LIMITS.     Pt to L&D for serial blood pressure and labs

## 2022-02-22 NOTE — H&P
History & Physical    Name: Maryan Rodriguez MRN: [id number]  SSN: [id number]    YOB: 2000  Age: 22 y.o. Sex: female        Subjective:     Estimated Date of Delivery: 3/30/22  OB History        1    Para        Term                AB        Living           SAB        IAB        Ectopic        Molar        Multiple        Live Births                    Ms. Elena Pereira is admitted with pregnancy at 34w6d for GHTN assess for Pre E. Prenatal course was normal. Please see prenatal records for details. Patient Active Problem List    Diagnosis    Gestational hypertension    34 weeks gestation of pregnancy    18 weeks gestation of pregnancy     NEEDS PRE E LABS- left office after high pressure with out labs - did not make follow up apt and CNM unable to get ahold of her with  line - tried all 3 phone numbers 22- left messages via   Provider: CNMs  G1 P 0  [address] by 2nd trimester Sono  Pertinents:  Late to care  IOB labs: Anemic, to start Vitron C TID, UTI---> KAE- resolved  Genetic Screening: Panorama, Horizen  Anatomy: Female, normal janny, Ant Placenta, 3VC  GTT: 181- NEEDS 3hour  Flu:  TDAP: 01/3/22  Rhogam:  Third Tri Labs: 9.8/31 - needs iron  GBS:  COVID:    Pain mgmt. in labor:  Feeding:  Circ:  Social:  Pt very stoic and quiet        No specialty comments available. Past Medical History:   Diagnosis Date    Anemia     Essential hypertension     Gestational hypertension     Headache      No past surgical history on file.   Social History     Occupational History    Not on file   Tobacco Use    Smoking status: Never Smoker    Smokeless tobacco: Never Used   Vaping Use    Vaping Use: Never used   Substance and Sexual Activity    Alcohol use: No    Drug use: No    Sexual activity: Yes     Partners: Male     Birth control/protection: None     Family History   Problem Relation Age of Onset    No Known Problems Mother     Hypertension Father        No Known Allergies  Prior to Admission medications    Medication Sig Start Date End Date Taking? Authorizing Provider   iron,carbonyl-vitamin C (Vitron-C) 65 mg iron- 125 mg TbEC Take 1 Tablet by mouth three (3) times daily. Indications: anemia from inadequate iron 21  Yes Holly Fermin CNM   famotidine (PEPCID) 20 mg tablet Take 1 Tablet by mouth two (2) times a day. Patient not taking: Reported on 2022   Adrianne Vargas CNM   cetirizine (ZYRTEC) 10 mg tablet Take 1 Tablet by mouth two (2) times a day. Patient not taking: Reported on 2022   Adrianne Vargas CNM   diphenhydrAMINE (BENADRYL) 2 % topical cream Apply  to affected area three (3) times daily as needed for Skin Irritation. Patient not taking: Reported on 2022   Adrianne Vargas CNM   nitrofurantoin, macrocrystal-monohydrate, (MACROBID) 100 mg capsule Take 1 Capsule by mouth two (2) times a day. Indications: urinary tract infection caused by Klebsiella bacteria  Patient not taking: Reported on 1/3/2022 11/5/21 2/22/22  Holly Fermin CNM        Review of Systems: A comprehensive review of systems was negative except for that written in the HPI. Objective:     Vitals:  Vitals:    22 1115 22 1130 22 1145   BP: (!) 135/94 (!) 133/101 (!) 136/97   Pulse: 89 81 78   Weight: 145 lb (65.8 kg)     Height: 5' (1.524 m)          Physical Exam:  Patient without distress.   Heart: Regular rate   Lung: normal respiratory effort  Abdomen: soft, nontender  Lower Extremities: minimal HUMPHREY  Membranes:  Intact  Fetal Heart Rate: baseline 125  Moderate variability   15X15 acels   No decelerations   Mild contractions pt does not feel     Prenatal Labs:   No results found for: RUBELLAEXT, GRBSEXT, HBSAGEXT, HIVEXT, RPREXT, GONNOEXT, CHLAMEXT     Assessment/Plan:   23 yo  SIUP @ 34 weeks 6 days  GHTN / eval for PreE  Labs  Observation for 24 plus hours for steroids   Reviewed with Benjamin Quiñones- in agreement with plan   If pressures or symptoms have MFM see - if stable IOL at 37 weeks         Signed By:  Jose David Mejia CNM     February 22, 2022

## 2022-02-23 VITALS
SYSTOLIC BLOOD PRESSURE: 135 MMHG | HEART RATE: 92 BPM | DIASTOLIC BLOOD PRESSURE: 91 MMHG | BODY MASS INDEX: 28.11 KG/M2 | HEIGHT: 60 IN | TEMPERATURE: 97.5 F | WEIGHT: 143.2 LBS | RESPIRATION RATE: 16 BRPM | OXYGEN SATURATION: 97 %

## 2022-02-23 PROCEDURE — 76811 OB US DETAILED SNGL FETUS: CPT | Performed by: OBSTETRICS & GYNECOLOGY

## 2022-02-23 PROCEDURE — 59025 FETAL NON-STRESS TEST: CPT

## 2022-02-23 PROCEDURE — 74011250636 HC RX REV CODE- 250/636: Performed by: ADVANCED PRACTICE MIDWIFE

## 2022-02-23 PROCEDURE — 76819 FETAL BIOPHYS PROFIL W/O NST: CPT | Performed by: OBSTETRICS & GYNECOLOGY

## 2022-02-23 PROCEDURE — 74011000250 HC RX REV CODE- 250: Performed by: ADVANCED PRACTICE MIDWIFE

## 2022-02-23 PROCEDURE — 99253 IP/OBS CNSLTJ NEW/EST LOW 45: CPT | Performed by: OBSTETRICS & GYNECOLOGY

## 2022-02-23 PROCEDURE — 74011250637 HC RX REV CODE- 250/637: Performed by: ADVANCED PRACTICE MIDWIFE

## 2022-02-23 RX ADMIN — SODIUM CHLORIDE, PRESERVATIVE FREE 10 ML: 5 INJECTION INTRAVENOUS at 05:23

## 2022-02-23 RX ADMIN — SODIUM CHLORIDE, PRESERVATIVE FREE 10 ML: 5 INJECTION INTRAVENOUS at 14:57

## 2022-02-23 RX ADMIN — Medication 1 TABLET: at 09:00

## 2022-02-23 RX ADMIN — BETAMETHASONE SODIUM PHOSPHATE AND BETAMETHASONE ACETATE 12 MG: 3; 3 INJECTION, SUSPENSION INTRA-ARTICULAR; INTRALESIONAL; INTRAMUSCULAR at 13:29

## 2022-02-23 NOTE — PROGRESS NOTES
High Risk Obstetrics Progress Note    Name: Peter Chou MRN: [id number]  SSN: [id number]    YOB: 2000  Age: 22 y.o. Sex: female      Subjective:      LOS: 1 day    Estimated Date of Delivery: 3/30/22   Gestational Age Today: 35w0d     Patient admitted for preeclampsia. States she does not have abdominal pain  , chest pain, contractions, fever, headache , nausea and vomiting, pelvic pressure, right upper quadrant pain  , shortness of breath, vaginal bleeding , vaginal leaking of fluid  and visual disturbances. Objective:     Vitals:  Blood pressure 120/62, pulse 89, temperature 98.2 °F (36.8 °C), resp. rate 16, height 5' (1.524 m), weight (P) 143 lb 3.2 oz (65 kg), last menstrual period 2021, SpO2 98 %, not currently breastfeeding. Temp (24hrs), Av.2 °F (36.8 °C), Min:98.1 °F (36.7 °C), Max:98.3 °F (45.9 °C)    Systolic (44GYT), QCU:099 , Min:120 , VNR:201      Diastolic (18PMY), NZA:48, Min:62, Max:103       Intake and Output:         Physical Exam:  Patient without distress.   Heart: Regular rate   Lung: normal respiratory effort  Abdomen: soft, nontender  Fundus: soft and non tender  Lower Extremities: mild HUMPHREY improved with resting overnight       Membranes:  Intact    Uterine Activity:  Irregular mild pt does not feel     Fetal Heart Rate:  Baseline: 125 per minute  Variability: moderate  Accelerations: yes        Labs:   Recent Results (from the past 36 hour(s))   CBC W/O DIFF    Collection Time: 22 11:21 AM   Result Value Ref Range    WBC 9.7 3.6 - 11.0 K/uL    RBC 4.47 3.80 - 5.20 M/uL    HGB 11.8 11.5 - 16.0 g/dL    HCT 36.5 35.0 - 47.0 %    MCV 81.7 80.0 - 99.0 FL    MCH 26.4 26.0 - 34.0 PG    MCHC 32.3 30.0 - 36.5 g/dL    RDW 24.9 (H) 11.5 - 14.5 %    PLATELET 824 285 - 869 K/uL    MPV 10.9 8.9 - 12.9 FL    NRBC 0.0 0  WBC    ABSOLUTE NRBC 0.00 0.00 - 8.00 K/uL   METABOLIC PANEL, COMPREHENSIVE    Collection Time: 22 11:21 AM   Result Value Ref Range Sodium 137 136 - 145 mmol/L    Potassium 5.1 3.5 - 5.1 mmol/L    Chloride 113 (H) 97 - 108 mmol/L    CO2 18 (L) 21 - 32 mmol/L    Anion gap 6 5 - 15 mmol/L    Glucose 74 65 - 100 mg/dL    BUN 5 (L) 6 - 20 MG/DL    Creatinine 0.53 (L) 0.55 - 1.02 MG/DL    BUN/Creatinine ratio 9 (L) 12 - 20      GFR est AA >60 >60 ml/min/1.73m2    GFR est non-AA >60 >60 ml/min/1.73m2    Calcium 8.6 8.5 - 10.1 MG/DL    Bilirubin, total 0.3 0.2 - 1.0 MG/DL    ALT (SGPT) 15 12 - 78 U/L    AST (SGOT) 51 (H) 15 - 37 U/L    Alk. phosphatase 278 (H) 45 - 117 U/L    Protein, total 6.5 6.4 - 8.2 g/dL    Albumin 2.5 (L) 3.5 - 5.0 g/dL    Globulin 4.0 2.0 - 4.0 g/dL    A-G Ratio 0.6 (L) 1.1 - 2.2     URIC ACID    Collection Time: 02/22/22 11:21 AM   Result Value Ref Range    Uric acid 4.4 2.6 - 6.0 MG/DL   LD    Collection Time: 02/22/22 11:21 AM   Result Value Ref Range     (H) 81 - 246 U/L   HEMOGLOBIN A1C WITH EAG    Collection Time: 02/22/22 11:22 AM   Result Value Ref Range    Hemoglobin A1c 5.2 4.0 - 5.6 %    Est. average glucose 103 mg/dL   PROTEIN/CREATININE RATIO, URINE    Collection Time: 02/22/22 12:33 PM   Result Value Ref Range    Protein, urine random 13 (H) 0.0 - 11.9 mg/dL    Creatinine, urine 22.40 mg/dL    Protein/Creat. urine Ratio 0.6         Assessment and Plan: Active Problems:    Gestational hypertension (2/22/2022)      34 weeks gestation of pregnancy (2/22/2022)       Preeclampsia:  mild  Complete 48 hour course of steroids to promote fetal lung maturity.   Strict Input and Output and Daily weights  Check 24 hour urine for total Protein  Daily Fetal monitoring with Non-stress tests  MFM consult today to discuss      Tasha Guzmán CNM

## 2022-02-23 NOTE — PROGRESS NOTES
[address] February 23, 2022       RE: Mine Lindsey      To Whom It May Concern,    This is to certify that Mine Lindsey was hospitalized from 02/22/2022 to 02/23/2022. Please feel free to contact my office at [phone] if you have any questions or concerns. Thank you for your assistance in this matter.       Sincerely,  Frankie Schrader RN

## 2022-02-23 NOTE — DISCHARGE INSTRUCTIONS
Patient Education        ??????? ???????????? 34 ???? 36 ?????????: ??????? ??????????? Weeks 34 to 36 of Your Pregnancy: Care Instructions  ??????? ??????? ???????????     ?? ??????? ????? ? ??????? ?????? ??? ???? ???? ?? ???? ??? ???? ??? ?????? ??????? ??????? ????????? ???? ???? ????? ??? ???? ???? ??????? ??????? ??????? ???????? ????? ????? ???????? ???? ???????? ?? ??? ?? ???? ???????????? ????? ????? ???????? ????? ??? ????  ??????? ??????, ????, ??????? ?? ?????? ????? ???? ??????????? ????? ?????? ????????? ?? ???????? ?????? ?? ??????? ?????????? ????? ???? ? ???, ??????? ???? ??????????? ??? ??????? ??? ??????????? ?????? ??????? ???????????? ????? ???? ?????? ?????????? ?????? ???????? ???? ??? ???????, ?????? ??????? ???? ??? ??????? ???? ?? ??????????? ??? ???????? ????? ????? ???? ??? ??????? ???????? ?? ??????? ?????? ?? ??????? ??????? ? ???? ???? ????? ??? ????? ????? ??????? ???? ??????? ??????? ???? ??? ????? ?????  ??????? ?? ??????????? ?????? Tdap ??? ??????? ????????? ??? ???, ???? ??? ??????? ???????? ????? ??????????? ???????? ?????????? ???? ??????? ????? ??????? ???????? ??????? ??????? ??????? ???? ????? ???????  36 ? ???????, ??????? ??????????? ???? B ??????????? (GBS) ? ? ???? ??????? ?????? ?? GBS ??????? ???????????? ?? ??? ????? ? ???????? ???? ????? ???? ??????? ???????? ?????????? ?????? ????? ????? ??????? ????????? ????? ??????? ?????????? ??? ??????? ??????? ?????? ???????????? ??????? ???? ??????????? ?????? ??????? ???????? ???????????? ?????? ????? ??????  ???-?? ?????? ??????? ????? ??? ????????? ????? ??? ??? ??? ????????????? ??? ??? ????? ??????? ????????? ???? ??? ???????? ????????? ??????? ? ??? ????? ????????? ?? ?????????? ????? ?????? ????????? ???? ????? ??? ????? ???? ????????? ???? ?????? ??? ?? ?????? ????? ???  ??????? ???? ????? ??????? ???? ???? ???????????  ??????? ?????? ????????? ?????? ??????????  · ????? ???????? ??????? ??? ?????? ????? ??????? ??????? ?????? ????????? ???? ??????????  · ??????? ??????? ??????? ???? ????????? ???? ???? ??????????? ????????? ???? ?? ?????-???????? ??????? ???? ??????? ??????? ?????? ??????? ??????? ??????? ???? ?????? ?????? ???? ???????????  · ?????? ?????? ??????? ???? ???? ?????????? ???, ??????? ??????????? ?????? ????? ????????? ???? ?????????? ???? ??????? ????? ???????? ??????? ? ???? ??????? ????? ????? ????????? ??????? ???? ??????? ?????? ?????? ?????? ??????? ????? ????? ???????????  · ??????? ????? ???? ??????? ?????????? ?? ??????? ????? ?????????? ???? ??????? ??????? ??????? ???? ???? ????? ?????? ??????? ????????? ???? ?????? ????????? ??????? ????? ???????? ????? ??????? ??????????? ???????? ???? ????? ??? ???????????  · ?????, ??????? ????? ?? IV ?????? ?????? ?????? ?????????  ????? ? ????? ??????? ??????  · ????? ????? ????? ??? ?????? ???: ?????, ????????? ? ??????  ? ??????? ??????????? ???? ????? ????? ?????? ????? ???????? ??? ?? ???? ????, ????? ??????? ???, ??? ??? ????????? ???? ? ???? ????????? ??????? ???? ???????????  ? ?????? ?????? ???? ?????? ???? ?????, ????? ?????? ????? ???? ????? ??????????? ?????? ??????? ??????, ????? ????????? ?? ?????? ????????? ??? ??????? ????????? ?????? ???????????  ? ???????? 3 ???? 4 ????? ? ???? ?????? 60 ????????? ????????? ????? ??????? ?????? ????? ????????? ??????? ??????? ????????? ????? ????? ????????? ??  ? ??????? ???? ????? ?????????? ??? ???, ????????? ???? ? ????? ????? ????????  ? ???????? ?????, ??????? ??????? ?????????? ????? ? ????????? ????? ????? ????????? ??????  ? ??????? ????? ????? ?????????? ?? ??????? ??????? ????? ???? ?????? ??????? ???????? ????? ?????? ??? ???????????  · ??????? ??????? ????? ????? ??????? ? ??????? ???? ???? ???? ????? ?????? ????? ???? ??????  ? ???? ???????? ???????? ????? ????? ????????? ???? ????? ???? ????????  ? ????? ??????? ???? ????? ?????? ????? ??????????? ???????? ????? ??????? ???? ?????? ???? ????? ??????  ? ???????? ???????????? ????? ????????? ??????????? ?? ????????? ???? ????? ??????? ?? ???????? ???-??? ?????????? ????????????? ??????? ??????????? ????? ???? ???? ???? ???????????  ? ???????? ????? ? ????????? ??? ????? ???????? ????? ???? ?? ??????? ? ??? ???? ??? ???????????  ? ???? ?????? ??????, ??????? ????? ?????????????  · ?????? ??? ????? ???? ?? ??????????? ?????????? ????? ???????? ??? ???? 30 ????? ?? ?? ????? ??? ??? ??? ?????  · ????? ??? ????? ??? ????????? ??? ??? ??????? ???????? ? ??????????? ?????? ????? ???? ?????????? ?? ????? ????????? ????????? ??????? ????? ?? ?????? ??? ???  ??????? ?? ???? ????? ???????????  ???? ????????   [email or web address]  ???????? ????????? B912 ?????? ?? ????? ??? ?????? \"??????? ???????????? 34 ???? 36 ?????????: ??????? ???????????.\"  ?? ????? ???: 2021 06 16               ????????? ?????: 13.0  © 4796-2634 [employer]. ???????? ?????????????? ?????? ???????? ???????? ??????? ????????? ?????? ????? ??????? ??? ???????? ?? ???????? ?????? ?? ?? ?????????? ?????? ????????? ??? ???, ???? ????? ????????? ?????? ????? ????????? ???????????  [employer], ?? ??????? ?? ????????? ???????? ???? ???? ??? ???????? ?? ?????????? ???????? ??????

## 2022-02-23 NOTE — DISCHARGE SUMMARY
Antepartum Discharge Summary     Name: Jassi Dong MRN: [id number]  SSN: [id number]    YOB: 2000  Age: 22 y.o. Sex: female      Allergies: Patient has no known allergies. Admit Date: 2022    Discharge Date: 2022     Admitting Physician: Mark Anthony Polanco CNM     Attending Physician:  Jovanny Bazan*     * Admission Diagnoses: 34 weeks gestation of pregnancy [Z3A.34]  Gestational hypertension [O13.9]    * Discharge Diagnoses:   Hospital Problems as of 2022 Date Reviewed: 2022          Codes Class Noted - Resolved POA    Gestational hypertension ICD-10-CM: O13.9  ICD-9-CM: 642.30  2022 - Present Unknown        34 weeks gestation of pregnancy ICD-10-CM: Z3A.34  ICD-9-CM: V22.2  2022 - Present Unknown             Lab Results   Component Value Date/Time    ABO/Rh(D) O POSITIVE 2021 03:49 PM      Immunization History   Administered Date(s) Administered    HPV (9-valent) 2016, 2017    Hep A Vaccine 2016    Hep B Vaccine 2016, 2016, 2016    IPV 2016, 2017    Influenza Vaccine 2016    MMR 2016, 2016    Meningococcal (MCV4P) Vaccine 2016    Td 2016, 2016    Tdap 2017, 2022    Varicella Virus Vaccine 2016       * Discharge Condition: stable    * Procedures: no procedure   * No surgery found *      [facility] Course:    - Preeclampsia:                                     - Preeclampsia labs were consistent with mild preeclampsia. - Continue bedrest.    - Continue  testing.    - follow up in office Monday     * Disposition: Home    Discharge Medications:   Current Discharge Medication List          * Follow-up Care/Patient Instructions:   Activity: bed rest with bathroom privileges, rest more than she is up and moving   Diet: Regular Diet  Wound Care: None needed    Follow-up Information    None

## 2022-02-23 NOTE — PROGRESS NOTES
Bedside and Verbal shift change report given to Nancy Garsia RN (oncoming nurse) by Ksenia Juarez RN (offgoing nurse). Report included the following information SBAR, Kardex, ED Summary, Procedure Summary, Intake/Output, MAR, Accordion, Recent Results and Med Rec Status.

## 2022-02-23 NOTE — PROGRESS NOTES
Reviewed Dr. Zuly Santana note.    Pt to be discharged home today to follow up on Monday at [address] for blood pressure check -     Pre E precuations-    Plan for delivery at 37 weeks - message sent to       Dot Cates CNM

## 2022-02-23 NOTE — CONSULTS
MATERNAL FETAL MEDICINE  INPATIENT CONSULTATION    REQUESTED BY: John Hdez*   DATE OF CONSULT: 22    REASON FOR CONSULTATION: elevated blood pressures    Chelsey Chi is a 22 y.o. female  at 35w0d. HPI  Izzy Fermin was seen in the office earlier this week with elevated blood pressures. She left the office without getting labs. She was asked to return to L&D for blood pressure monitoring. She has has multiple blood pressures >140/90. She has not had severe range blood pressures. She denies headaches, vision changes, RUQ pain, nausea or vomiting. She reports an uncomplicated pregnancy    Patient Active Problem List   Diagnosis Code    18 weeks gestation of pregnancy Z3A.18    Gestational hypertension O13.9    34 weeks gestation of pregnancy Z3A.34       Past Medical History:   Diagnosis Date    Anemia     Essential hypertension     Gestational hypertension     Headache        No past surgical history on file.     OB History    Para Term  AB Living   1 0 0 0 0 0   SAB IAB Ectopic Molar Multiple Live Births   0 0 0 0 0 0       No Known Allergies      Current Facility-Administered Medications:     sodium chloride (NS) flush 5-40 mL, 5-40 mL, IntraVENous, Q8H, Brittnee Alexandre CNM, 10 mL at 22 6857    sodium chloride (NS) flush 5-40 mL, 5-40 mL, IntraVENous, PRN, John Hdez CNM    acetaminophen (TYLENOL) tablet 650 mg, 650 mg, Oral, Q4H PRN, John Hdez CNM    ondansetron (ZOFRAN ODT) tablet 4 mg, 4 mg, Oral, Q6H PRN, Wei Hinton CNM    prenatal vitamin tablet 1 Tablet, 1 Tablet, Oral, DAILY, John Hdez CNM, 1 Tablet at 22 0900    Social History     Tobacco Use    Smoking status: Never Smoker    Smokeless tobacco: Never Used   Vaping Use    Vaping Use: Never used   Substance Use Topics    Alcohol use: No    Drug use: No       Family History   Problem Relation Age of Onset    No Known Problems Mother     Hypertension Father        Review of Systems   Constitutional: Negative. HENT: Negative. Eyes: Negative. Respiratory: Negative. Cardiovascular: Negative. Gastrointestinal: Negative. Genitourinary: Negative. Musculoskeletal: Negative. Skin: Negative. Neurological: Negative. Endo/Heme/Allergies: Negative. Psychiatric/Behavioral: Negative. Visit Vitals  BP (!) 139/91 (BP 1 Location: Left upper arm, BP Patient Position: Semi fowlers)   Pulse 91   Temp 98.2 °F (36.8 °C)   Resp 16   Ht 5' (1.524 m)   Wt 65 kg (143 lb 3.2 oz)   LMP 2021 (Approximate)   SpO2 98%   Breastfeeding No   BMI 27.97 kg/m²       Gen: Comfortable, NAD  Resp: Comfortable respirations  CV: Well perfused  Abd: Gravid, NT, ND  Ext: Moving all extremities well  Neuro: Alert, interactive, appropriate    CBC, CMP normal  P/C ratio 0.6    ULTRASOUND:  Cephalic  EFW 2072 gms (00XF percentile)  BPP 8/8    ASSESSMENT:    22 y.o. female  at 35w0d with preeclampsia without severe features. Leann meets criteria for preeclampsia by blood pressure and proteinuria. She had not had any severe range blood pressures. She has completed her  corticosteroid course. She meets criteria to be managed as an outpatient. We discussed preeclampsia precautions and when she should call her obstetrician. Delivery is recommended at 37 weeks gestation unless she develops severe features. PLAN:     Completed  corticosteroid course   Blood pressures are stable, no need for an antihypertensive   Will need weekly testing with MFM and Ob   Delivery at 37 weeks unless she develops severe features   Stable for outpatient management. The patient visit was approximately 60 minutes with greater than half spent in face-to face counseling and coordination of care.     Bindu Gamez MD  Maternal Fetal Medicine

## 2022-02-23 NOTE — PROGRESS NOTES
6816: Bedside and Verbal shift change report given to SOSA (oncoming nurse) by GURPREET Agrawal (offgoing nurse). Report included the following information SBAR, Kardex, Intake/Output, MAR, Accordion and Recent Results.

## 2022-02-23 NOTE — PROGRESS NOTES
0730: Bedside and Verbal shift change report given to Brandy (oncoming nurse) by Cooper Pina RN. (offgoing nurse). Report included the following information SBAR, Kardex, ED Summary, Intake/Output, MAR, Accordion, Recent Results and Med Rec Status. 1720: I have reviewed discharge instructions with the patient. The patient verbalized understanding. Discharge medications reviewed with patient and appropriate educational materials and side effects teaching were provided.

## 2022-02-26 LAB
BACTERIA SPEC CULT: NORMAL
SERVICE CMNT-IMP: NORMAL

## 2022-02-28 ENCOUNTER — HOSPITAL ENCOUNTER (EMERGENCY)
Age: 22
Discharge: STILL ADMITTED OR ADMINISTRATIVE | End: 2022-02-28
Attending: EMERGENCY MEDICINE

## 2022-02-28 ENCOUNTER — HOSPITAL ENCOUNTER (INPATIENT)
Age: 22
LOS: 3 days | Discharge: HOME | DRG: 566 | End: 2022-03-03
Attending: OBSTETRICS & GYNECOLOGY | Admitting: OBSTETRICS & GYNECOLOGY
Payer: MEDICAID

## 2022-02-28 ENCOUNTER — ROUTINE PRENATAL (OUTPATIENT)
Dept: OBGYN CLINIC | Age: 22
End: 2022-02-28
Payer: MEDICAID

## 2022-02-28 VITALS
BODY MASS INDEX: 29.06 KG/M2 | WEIGHT: 148 LBS | DIASTOLIC BLOOD PRESSURE: 112 MMHG | HEIGHT: 60 IN | SYSTOLIC BLOOD PRESSURE: 148 MMHG

## 2022-02-28 DIAGNOSIS — Z3A.35 35 WEEKS GESTATION OF PREGNANCY: ICD-10-CM

## 2022-02-28 DIAGNOSIS — O14.93 PRE-ECLAMPSIA IN THIRD TRIMESTER: Primary | ICD-10-CM

## 2022-02-28 LAB
ALBUMIN SERPL-MCNC: 2.7 G/DL (ref 3.5–5)
ALBUMIN/GLOB SERPL: 0.7 {RATIO} (ref 1.1–2.2)
ALP SERPL-CCNC: 258 U/L (ref 45–117)
ALT SERPL-CCNC: 28 U/L (ref 12–78)
ANION GAP SERPL CALC-SCNC: 7 MMOL/L (ref 5–15)
AST SERPL-CCNC: 19 U/L (ref 15–37)
BILIRUB SERPL-MCNC: 0.2 MG/DL (ref 0.2–1)
BUN SERPL-MCNC: 9 MG/DL (ref 6–20)
BUN/CREAT SERPL: 13 (ref 12–20)
CALCIUM SERPL-MCNC: 8.5 MG/DL (ref 8.5–10.1)
CHLORIDE SERPL-SCNC: 112 MMOL/L (ref 97–108)
CO2 SERPL-SCNC: 17 MMOL/L (ref 21–32)
CREAT SERPL-MCNC: 0.69 MG/DL (ref 0.55–1.02)
CREAT UR-MCNC: 15.5 MG/DL
ERYTHROCYTE [DISTWIDTH] IN BLOOD BY AUTOMATED COUNT: 26.5 % (ref 11.5–14.5)
GLOBULIN SER CALC-MCNC: 3.7 G/DL (ref 2–4)
GLUCOSE SERPL-MCNC: 93 MG/DL (ref 65–100)
HCT VFR BLD AUTO: 37.9 % (ref 35–47)
HGB BLD-MCNC: 12.1 G/DL (ref 11.5–16)
MCH RBC QN AUTO: 27 PG (ref 26–34)
MCHC RBC AUTO-ENTMCNC: 31.9 G/DL (ref 30–36.5)
MCV RBC AUTO: 84.6 FL (ref 80–99)
NRBC # BLD: 0.05 K/UL (ref 0–0.01)
NRBC BLD-RTO: 0.3 PER 100 WBC
PLATELET # BLD AUTO: 229 K/UL (ref 150–400)
PMV BLD AUTO: 10.6 FL (ref 8.9–12.9)
POTASSIUM SERPL-SCNC: 4.6 MMOL/L (ref 3.5–5.1)
PROT SERPL-MCNC: 6.4 G/DL (ref 6.4–8.2)
PROT UR-MCNC: 39 MG/DL (ref 0–11.9)
PROT/CREAT UR-RTO: 2.5
RBC # BLD AUTO: 4.48 M/UL (ref 3.8–5.2)
SODIUM SERPL-SCNC: 136 MMOL/L (ref 136–145)
WBC # BLD AUTO: 15.6 K/UL (ref 3.6–11)

## 2022-02-28 PROCEDURE — 74011250637 HC RX REV CODE- 250/637: Performed by: ADVANCED PRACTICE MIDWIFE

## 2022-02-28 PROCEDURE — 0502F SUBSEQUENT PRENATAL CARE: CPT | Performed by: ADVANCED PRACTICE MIDWIFE

## 2022-02-28 PROCEDURE — 59025 FETAL NON-STRESS TEST: CPT

## 2022-02-28 PROCEDURE — 36415 COLL VENOUS BLD VENIPUNCTURE: CPT

## 2022-02-28 PROCEDURE — 80053 COMPREHEN METABOLIC PANEL: CPT

## 2022-02-28 PROCEDURE — 84156 ASSAY OF PROTEIN URINE: CPT

## 2022-02-28 PROCEDURE — 85027 COMPLETE CBC AUTOMATED: CPT

## 2022-02-28 PROCEDURE — 76819 FETAL BIOPHYS PROFIL W/O NST: CPT | Performed by: OBSTETRICS & GYNECOLOGY

## 2022-02-28 PROCEDURE — 74011000250 HC RX REV CODE- 250: Performed by: ADVANCED PRACTICE MIDWIFE

## 2022-02-28 PROCEDURE — 65410000002 HC RM PRIVATE OB

## 2022-02-28 RX ORDER — DOCUSATE SODIUM 100 MG/1
100 CAPSULE, LIQUID FILLED ORAL
Status: DISCONTINUED | OUTPATIENT
Start: 2022-02-28 | End: 2022-03-03 | Stop reason: HOSPADM

## 2022-02-28 RX ORDER — FOLIC ACID/MULTIVIT,IRON,MINER 0.4MG-18MG
1 TABLET ORAL DAILY
Status: DISCONTINUED | OUTPATIENT
Start: 2022-03-01 | End: 2022-03-03 | Stop reason: HOSPADM

## 2022-02-28 RX ORDER — LANOLIN ALCOHOL/MO/W.PET/CERES
1 CREAM (GRAM) TOPICAL 2 TIMES DAILY WITH MEALS
Status: DISCONTINUED | OUTPATIENT
Start: 2022-02-28 | End: 2022-03-03 | Stop reason: HOSPADM

## 2022-02-28 RX ORDER — SODIUM CHLORIDE 0.9 % (FLUSH) 0.9 %
5-40 SYRINGE (ML) INJECTION AS NEEDED
Status: DISCONTINUED | OUTPATIENT
Start: 2022-02-28 | End: 2022-03-03 | Stop reason: HOSPADM

## 2022-02-28 RX ORDER — MAG HYDROX/ALUMINUM HYD/SIMETH 200-200-20
30 SUSPENSION, ORAL (FINAL DOSE FORM) ORAL
Status: DISCONTINUED | OUTPATIENT
Start: 2022-02-28 | End: 2022-03-03 | Stop reason: HOSPADM

## 2022-02-28 RX ORDER — SODIUM CHLORIDE 0.9 % (FLUSH) 0.9 %
5-40 SYRINGE (ML) INJECTION EVERY 8 HOURS
Status: DISCONTINUED | OUTPATIENT
Start: 2022-02-28 | End: 2022-03-03 | Stop reason: HOSPADM

## 2022-02-28 RX ADMIN — FERROUS SULFATE TAB 325 MG (65 MG ELEMENTAL FE) 325 MG: 325 (65 FE) TAB at 21:25

## 2022-02-28 RX ADMIN — SODIUM CHLORIDE, PRESERVATIVE FREE 10 ML: 5 INJECTION INTRAVENOUS at 16:00

## 2022-02-28 NOTE — PROGRESS NOTES
1013-pt here from MD office with high BP  1015-using  video phone, discussed plan, pt has no questions and no complaints  1100-resting, waiting for lab results  1230-updated D Max Pascal  1300-to [facility] in wheelchair  1450-spoke with Andra Morataya RN  1500-to Christine Mortensen

## 2022-02-28 NOTE — H&P
History & Physical    Name: Mine Lindsey MRN: [id number]  SSN: [id number]    YOB: 2000  Age: 22 y.o. Sex: female        Subjective:     Estimated Date of Delivery: 3/30/22  OB History        1    Para        Term                AB        Living           SAB        IAB        Ectopic        Molar        Multiple        Live Births                    Ms. Alee Maldonado is admitted with pregnancy at 35w5d for Pre-E. Prenatal course was complicated by preeclampsia. Please see prenatal records for details. Admit to [facility]    Patient Active Problem List    Diagnosis    35 weeks gestation of pregnancy    Preeclampsia, third trimester    Gestational hypertension    34 weeks gestation of pregnancy    18 weeks gestation of pregnancy     Provider: CNMs  G1 P 0  EDC by 2nd trimester Sono  Pertinents:  Pre-ED at 29 weeks - Ante partum admission for steroids and MFM consult  IUGR by [facility]  Rash in third trimester- Torch titers neg  Late to care  IOB labs: Anemic, to start Vitron C TID, UTI---> KAE- resolved  Genetic Screening: Panorama, Horizen  Anatomy: Female, normal janny, Ant Placenta, 3VC  GTT: 181- NEEDS 3hour- HA1C - 5.1   Flu:  TDAP: 01/3/22  Rhogam:  Third Tri Labs: 9.8 - needs iron  GBS:  COVID:    Pain mgmt. in labor:  Feeding:  Circ:  Social:  Pt very stoic and quiet        No specialty comments available. Past Medical History:   Diagnosis Date    Anemia     Essential hypertension     Gestational hypertension     Headache      History reviewed. No pertinent surgical history.   Social History     Occupational History    Not on file   Tobacco Use    Smoking status: Never Smoker    Smokeless tobacco: Never Used   Vaping Use    Vaping Use: Never used   Substance and Sexual Activity    Alcohol use: No    Drug use: No    Sexual activity: Yes     Partners: Male     Birth control/protection: None     Family History   Problem Relation Age of Onset    No Known Problems Mother    Magaly Aguillon Hypertension Father        No Known Allergies  Prior to Admission medications    Not on File        Review of Systems: A comprehensive review of systems was negative except for that written in the HPI. Objective:     Vitals:  Vitals:    02/28/22 1031 02/28/22 1035 02/28/22 1235 02/28/22 1602   BP: (!) 132/95 (!) 133/99 127/67 125/82   Pulse: 74 75 67 82   Resp:    15   Temp:    97.7 °F (36.5 °C)   SpO2:    96%        Physical Exam:  Patient without distress. Abdomen: soft, nontender  Fundus: soft and non tender  Perineum: blood absent, amniotic fluid absent  Lower Extremities:  - Edema No  Membranes:  Intact  Fetal Heart Rate: Reactive  Baseline: 125 per minute, Cat I  Variability: moderate  Accelerations: yes  Decelerations: none  Uterine contractions: none    Prenatal Labs:   Lab Results   Component Value Date/Time    Rubella, External immune 11/02/2021 12:00 AM    GrBStrep, External NEGATIVE 02/22/2022 12:00 AM    HBsAg, External negative 11/02/2021 12:00 AM    RPR, External non reactive 11/02/2021 12:00 AM        Assessment/Plan:     Plan: Admit for Reassuring fetal status, Assessed by MFM, will admit with inpatient observation until Thursday when she will be re-evalutated by MFM. Group B Strep was negative. Bedrest with BRP, may shower once a day  Close observation  With another severe range BP or severe features, pt will be moved back to L&D for IOL/Delivery  BID NST  PNV and FE BID  Reg Diet    DYANA Fajardo/MONISHA      Signed By:  Little Li CNM     February 28, 2022

## 2022-02-28 NOTE — PROGRESS NOTES
1013-pt here from MD office with high BP  1015-using  video phone, discussed plan, pt has no questions and no complaints  1100-resting, waiting for lab results  1230-updated D Jose Guadalupe Shoulders  1300-to MFM in wheelchair

## 2022-03-01 PROCEDURE — 59025 FETAL NON-STRESS TEST: CPT

## 2022-03-01 PROCEDURE — 74011000250 HC RX REV CODE- 250: Performed by: ADVANCED PRACTICE MIDWIFE

## 2022-03-01 PROCEDURE — 65410000002 HC RM PRIVATE OB

## 2022-03-01 PROCEDURE — 74011250637 HC RX REV CODE- 250/637: Performed by: ADVANCED PRACTICE MIDWIFE

## 2022-03-01 RX ORDER — NYSTATIN 100000 U/G
CREAM TOPICAL 2 TIMES DAILY
Status: DISCONTINUED | OUTPATIENT
Start: 2022-03-01 | End: 2022-03-03 | Stop reason: HOSPADM

## 2022-03-01 RX ADMIN — FERROUS SULFATE TAB 325 MG (65 MG ELEMENTAL FE) 325 MG: 325 (65 FE) TAB at 21:29

## 2022-03-01 RX ADMIN — NYSTATIN: 100000 CREAM TOPICAL at 12:54

## 2022-03-01 RX ADMIN — NYSTATIN: 100000 CREAM TOPICAL at 18:13

## 2022-03-01 RX ADMIN — FERROUS SULFATE TAB 325 MG (65 MG ELEMENTAL FE) 325 MG: 325 (65 FE) TAB at 12:54

## 2022-03-01 RX ADMIN — SODIUM CHLORIDE, PRESERVATIVE FREE 10 ML: 5 INJECTION INTRAVENOUS at 05:06

## 2022-03-01 RX ADMIN — ALUMINUM HYDROXIDE, MAGNESIUM HYDROXIDE, AND SIMETHICONE 30 ML: 200; 200; 20 SUSPENSION ORAL at 05:04

## 2022-03-01 RX ADMIN — Medication 1 TABLET: at 09:15

## 2022-03-01 NOTE — PROGRESS NOTES
Bedside shift change report given to Amarilis Key RN and LON Riley (oncoming nurse) by Hay Ziegler RN (offgoing nurse). Report included the following information SUZANNE, Jennifer, MAR, Ben, Recent Results and Med Rec Status       0900 Interpretor #[id number] used for translation needs.

## 2022-03-01 NOTE — PROGRESS NOTES
Ante Partum Progress Note    Veronique Johnson  35w6d    Assessment: 35w6d   Pre-eclampsia, mild    Plan:  Continue hospitalization with hospitalized bedrest and Maternal fetal medicine consultation Thursday    Orders/Charges: Non Stress Test  X 2    No overnight events  Currently resting with eyes closed  Will evaluate with full PE this evening when pt awake, sooner prn    Vitals:  Visit Vitals  BP (!) 135/94 (BP 1 Location: Right upper arm, BP Patient Position: Sitting)   Pulse (!) 109   Temp 98.4 °F (36.9 °C)   Resp 18   LMP 2021 (Approximate)   SpO2 97%     Temp (24hrs), Av.1 °F (36.7 °C), Min:97.7 °F (36.5 °C), Max:98.4 °F (36.9 °C)      Last 24hr Input/Output:    Intake/Output Summary (Last 24 hours) at 3/1/2022 0755  Last data filed at 2022 2100  Gross per 24 hour   Intake 840 ml   Output    Net 840 ml        Non stress test:  Reactive    NST procedure note    Veronique Johnson is a ,  22 y.o. female  whose Patient's last menstrual period was 2021 (approximate). was on  who presents for fetal non-stress test.    She is 35w6d and was monitored for 30 minutes and the FHR was reactive, with accelerations. NST Interpretation:    FHR baseline 130 bpm, variability moderate, accelerations present, decelerations Absent. Uterine contractions were present, mild and irregular.                      Labs:     Lab Results   Component Value Date/Time    WBC 15.6 (H) 2022 10:29 AM    WBC 9.7 2022 11:21 AM    WBC 12.4 (H) 2022 11:12 AM    WBC 8.6 10/26/2021 05:46 PM    WBC 7.7 2021 05:39 PM    HGB 12.1 2022 10:29 AM    HGB 11.8 2022 11:21 AM    HGB 9.8 (L) 2022 11:12 AM    HGB 9.0 (L) 10/26/2021 05:46 PM    HGB 9.7 (L) 2021 05:39 PM    HCT 37.9 2022 10:29 AM    HCT 36.5 2022 11:21 AM    HCT 31.5 (L) 2022 11:12 AM    HCT 28.3 (L) 10/26/2021 05:46 PM    HCT 31.9 (L) 2021 05:39 PM    PLATELET 518  10:29 AM PLATELET 554 49/58/2250 11:21 AM    PLATELET 598 04/16/7993 11:12 AM    PLATELET 366 22/04/5926 05:46 PM    PLATELET 795 99/64/7422 05:39 PM       Recent Results (from the past 24 hour(s))   CBC W/O DIFF    Collection Time: 02/28/22 10:29 AM   Result Value Ref Range    WBC 15.6 (H) 3.6 - 11.0 K/uL    RBC 4.48 3.80 - 5.20 M/uL    HGB 12.1 11.5 - 16.0 g/dL    HCT 37.9 35.0 - 47.0 %    MCV 84.6 80.0 - 99.0 FL    MCH 27.0 26.0 - 34.0 PG    MCHC 31.9 30.0 - 36.5 g/dL    RDW 26.5 (H) 11.5 - 14.5 %    PLATELET 648 632 - 850 K/uL    MPV 10.6 8.9 - 12.9 FL    NRBC 0.3 (H) 0  WBC    ABSOLUTE NRBC 0.05 (H) 0.00 - 0.01 K/uL   PROTEIN/CREATININE RATIO, URINE    Collection Time: 02/28/22 10:29 AM   Result Value Ref Range    Protein, urine random 39 (H) 0.0 - 11.9 mg/dL    Creatinine, urine 15.50 mg/dL    Protein/Creat. urine Ratio 2.5     METABOLIC PANEL, COMPREHENSIVE    Collection Time: 02/28/22 10:29 AM   Result Value Ref Range    Sodium 136 136 - 145 mmol/L    Potassium 4.6 3.5 - 5.1 mmol/L    Chloride 112 (H) 97 - 108 mmol/L    CO2 17 (L) 21 - 32 mmol/L    Anion gap 7 5 - 15 mmol/L    Glucose 93 65 - 100 mg/dL    BUN 9 6 - 20 MG/DL    Creatinine 0.69 0.55 - 1.02 MG/DL    BUN/Creatinine ratio 13 12 - 20      GFR est AA >60 >60 ml/min/1.73m2    GFR est non-AA >60 >60 ml/min/1.73m2    Calcium 8.5 8.5 - 10.1 MG/DL    Bilirubin, total 0.2 0.2 - 1.0 MG/DL    ALT (SGPT) 28 12 - 78 U/L    AST (SGOT) 19 15 - 37 U/L    Alk. phosphatase 258 (H) 45 - 117 U/L    Protein, total 6.4 6.4 - 8.2 g/dL    Albumin 2.7 (L) 3.5 - 5.0 g/dL    Globulin 3.7 2.0 - 4.0 g/dL    A-G Ratio 0.7 (L) 1.1 - 2.2         DYANA Neumann/MONISHA

## 2022-03-01 NOTE — PROGRESS NOTES
Ante Partum Progress Note    Benita Self  35w6d    Assessment: 35w6d   Pre-eclampsia, mild    Plan:  Continue hospitalization with hospitalized bedrest and Maternal fetal medicine consultation Thursday    Orders/Charges: Non Stress Test  X 2    Patient states she does not have headache , abdominal pain  , contractions, right upper quadrant pain  , vaginal bleeding , swelling and vaginal leaking of fluid     Vitals:  Visit Vitals  /79   Pulse 74   Temp 98.2 °F (36.8 °C)   Resp 18   LMP 2021 (Approximate)   SpO2 96%     Temp (24hrs), Av.2 °F (36.8 °C), Min:97.7 °F (36.5 °C), Max:98.4 °F (36.9 °C)      Last 24hr Input/Output:    Intake/Output Summary (Last 24 hours) at 3/1/2022 1743  Last data filed at 2022 2100  Gross per 24 hour   Intake 840 ml   Output    Net 840 ml        Non stress test:  Reactive    No data found. No data found. Uterine Activity: None     Exam:  Patient without distress.               HRR No MRG              LCTAB              BS pos x 4 quads   Abdomen, fundus soft non-tender   Extremities, no redness or tenderness               Additional Exam: Patient without distress, Abdomen soft, non-tender, Fundus soft and non tender and Lower extremities edema No    Labs:     Lab Results   Component Value Date/Time    WBC 15.6 (H) 2022 10:29 AM    WBC 9.7 2022 11:21 AM    WBC 12.4 (H) 2022 11:12 AM    WBC 8.6 10/26/2021 05:46 PM    WBC 7.7 2021 05:39 PM    HGB 12.1 2022 10:29 AM    HGB 11.8 2022 11:21 AM    HGB 9.8 (L) 2022 11:12 AM    HGB 9.0 (L) 10/26/2021 05:46 PM    HGB 9.7 (L) 2021 05:39 PM    HCT 37.9 2022 10:29 AM    HCT 36.5 2022 11:21 AM    HCT 31.5 (L) 2022 11:12 AM    HCT 28.3 (L) 10/26/2021 05:46 PM    HCT 31.9 (L) 2021 05:39 PM    PLATELET 413  10:29 AM    PLATELET 011  11:21 AM    PLATELET 513  11:12 AM    PLATELET 299  05:46 PM    PLATELET 903 06/07/2021 05:39 PM       No results found for this or any previous visit (from the past 24 hour(s)). Vicky Galindo.  DYANA Coronado/MONISHA

## 2022-03-01 NOTE — PROGRESS NOTES
Bedside and Verbal shift change report given to BRIGIDA Schwartz RN (oncoming nurse) by GURPREET Lao RN (offgoing nurse). Report included the following information SBAR and Kardex.

## 2022-03-02 PROCEDURE — 65410000002 HC RM PRIVATE OB

## 2022-03-02 PROCEDURE — 74011000250 HC RX REV CODE- 250: Performed by: ADVANCED PRACTICE MIDWIFE

## 2022-03-02 PROCEDURE — 59025 FETAL NON-STRESS TEST: CPT

## 2022-03-02 PROCEDURE — 74011250637 HC RX REV CODE- 250/637: Performed by: ADVANCED PRACTICE MIDWIFE

## 2022-03-02 RX ADMIN — NYSTATIN: 100000 CREAM TOPICAL at 18:01

## 2022-03-02 RX ADMIN — FERROUS SULFATE TAB 325 MG (65 MG ELEMENTAL FE) 325 MG: 325 (65 FE) TAB at 12:39

## 2022-03-02 RX ADMIN — FERROUS SULFATE TAB 325 MG (65 MG ELEMENTAL FE) 325 MG: 325 (65 FE) TAB at 21:22

## 2022-03-02 RX ADMIN — SODIUM CHLORIDE, PRESERVATIVE FREE 10 ML: 5 INJECTION INTRAVENOUS at 14:00

## 2022-03-02 RX ADMIN — SODIUM CHLORIDE, PRESERVATIVE FREE 5 ML: 5 INJECTION INTRAVENOUS at 21:23

## 2022-03-02 RX ADMIN — Medication 1 TABLET: at 09:56

## 2022-03-02 RX ADMIN — NYSTATIN: 100000 CREAM TOPICAL at 09:56

## 2022-03-02 NOTE — PROGRESS NOTES
Verbal and Bedside shift change report given to BRIGIDA Vargas RN (oncoming nurse) by Nasir Khanna RN, Eugenia Garcia RN (offgoing nurse). Report included the following information SBAR and Kardex.

## 2022-03-02 NOTE — PROGRESS NOTES
Bedside shift change report given to Latricia Reza, RN and Get Amanda RN (oncoming nurse) by Mayra Renee RN (offgoing nurse). Report included the following information SBAR, Kardex, MAR, Accordion, Recent Results, Med Rec Status and Alarm Parameters . [employer] #[id number] used for translation needs.

## 2022-03-02 NOTE — PROGRESS NOTES
Ante Partum Progress Note    Arbutus Bibles  36w0d    Assessment: 36w0d   Anemia and Pre-eclampsia, mild    Plan:  Continue hospitalization with hospitalized bedrest and Maternal fetal medicine consultation Tomorrow    Orders/Charges: Non Stress Test  X 2    Patient states she does not have headache , abdominal pain  , contractions, right upper quadrant pain  , vaginal bleeding , swelling and vaginal leaking of fluid     Vitals:  Visit Vitals  BP (!) 145/99 (BP 1 Location: Right upper arm, BP Patient Position: Sitting)   Pulse 86   Temp 98.1 °F (36.7 °C)   Resp 18   LMP 2021 (Approximate)   SpO2 95%     Temp (24hrs), Av.3 °F (36.8 °C), Min:98.1 °F (36.7 °C), Max:98.6 °F (37 °C)    Patient Vitals for the past 24 hrs:   Temp Pulse Resp BP SpO2   22 1238  86  (!) 145/99    22 0952 98.1 °F (36.7 °C) 83 18 112/81    22 0453  84  122/80    22 0010 98.6 °F (37 °C) 99 18 (!) 146/95    22 2038 98.1 °F (36.7 °C) 95 18 (!) 139/93 95 %   22 1650    120/79      Last 24hr Input/Output:  No intake or output data in the 24 hours ending 22 1613     Non stress test:  Reactive    No data found. No data found. Uterine Activity: None     Exam:  Patient without distress.     Abdomen, fundus soft non-tender                LCTAB                HRR No MRG     Extremities, no redness or tenderness               Additional Exam: Patient without distress, Abdomen soft, non-tender, Fundus soft and non tender, Right upper quadrant non-tender and Lower extremities edema No    Labs:     Lab Results   Component Value Date/Time    WBC 15.6 (H) 2022 10:29 AM    WBC 9.7 2022 11:21 AM    WBC 12.4 (H) 2022 11:12 AM    WBC 8.6 10/26/2021 05:46 PM    WBC 7.7 2021 05:39 PM    HGB 12.1 2022 10:29 AM    HGB 11.8 2022 11:21 AM    HGB 9.8 (L) 2022 11:12 AM    HGB 9.0 (L) 10/26/2021 05:46 PM    HGB 9.7 (L) 2021 05:39 PM    HCT 37.9 2022 10:29 AM    HCT 36.5 02/22/2022 11:21 AM    HCT 31.5 (L) 01/03/2022 11:12 AM    HCT 28.3 (L) 10/26/2021 05:46 PM    HCT 31.9 (L) 06/07/2021 05:39 PM    PLATELET 294 77/86/4758 10:29 AM    PLATELET 626 90/43/2892 11:21 AM    PLATELET 156 22/37/1832 11:12 AM    PLATELET 675 77/58/3190 05:46 PM    PLATELET 172 54/77/7137 05:39 PM       No results found for this or any previous visit (from the past 24 hour(s)). Kelle Perla.  DYANA Lepe/MONISHA

## 2022-03-03 VITALS
RESPIRATION RATE: 18 BRPM | DIASTOLIC BLOOD PRESSURE: 97 MMHG | SYSTOLIC BLOOD PRESSURE: 135 MMHG | TEMPERATURE: 98.1 F | HEART RATE: 91 BPM | OXYGEN SATURATION: 98 %

## 2022-03-03 LAB
ALBUMIN SERPL-MCNC: 2.5 G/DL (ref 3.5–5)
ALBUMIN/GLOB SERPL: 0.6 {RATIO} (ref 1.1–2.2)
ALP SERPL-CCNC: 268 U/L (ref 45–117)
ALT SERPL-CCNC: 38 U/L (ref 12–78)
ANION GAP SERPL CALC-SCNC: 4 MMOL/L (ref 5–15)
AST SERPL-CCNC: 28 U/L (ref 15–37)
BILIRUB SERPL-MCNC: 0.2 MG/DL (ref 0.2–1)
BUN SERPL-MCNC: 9 MG/DL (ref 6–20)
BUN/CREAT SERPL: 13 (ref 12–20)
CALCIUM SERPL-MCNC: 8.6 MG/DL (ref 8.5–10.1)
CHLORIDE SERPL-SCNC: 111 MMOL/L (ref 97–108)
CO2 SERPL-SCNC: 21 MMOL/L (ref 21–32)
COMMENT, HOLDF: NORMAL
CREAT SERPL-MCNC: 0.7 MG/DL (ref 0.55–1.02)
ERYTHROCYTE [DISTWIDTH] IN BLOOD BY AUTOMATED COUNT: 27.5 % (ref 11.5–14.5)
GLOBULIN SER CALC-MCNC: 4.2 G/DL (ref 2–4)
GLUCOSE SERPL-MCNC: 110 MG/DL (ref 65–100)
HCT VFR BLD AUTO: 40 % (ref 35–47)
HGB BLD-MCNC: 12.8 G/DL (ref 11.5–16)
LDH SERPL L TO P-CCNC: 232 U/L (ref 81–246)
MCH RBC QN AUTO: 27.8 PG (ref 26–34)
MCHC RBC AUTO-ENTMCNC: 32 G/DL (ref 30–36.5)
MCV RBC AUTO: 86.8 FL (ref 80–99)
NRBC # BLD: 0 K/UL (ref 0–0.01)
NRBC BLD-RTO: 0 PER 100 WBC
PLATELET # BLD AUTO: 197 K/UL (ref 150–400)
PMV BLD AUTO: 11 FL (ref 8.9–12.9)
POTASSIUM SERPL-SCNC: 4.1 MMOL/L (ref 3.5–5.1)
PROT SERPL-MCNC: 6.7 G/DL (ref 6.4–8.2)
RBC # BLD AUTO: 4.61 M/UL (ref 3.8–5.2)
SAMPLES BEING HELD,HOLD: NORMAL
SODIUM SERPL-SCNC: 136 MMOL/L (ref 136–145)
URATE SERPL-MCNC: 5.3 MG/DL (ref 2.6–6)
WBC # BLD AUTO: 13.6 K/UL (ref 3.6–11)

## 2022-03-03 PROCEDURE — 99231 SBSQ HOSP IP/OBS SF/LOW 25: CPT | Performed by: OBSTETRICS & GYNECOLOGY

## 2022-03-03 PROCEDURE — 76820 UMBILICAL ARTERY ECHO: CPT | Performed by: OBSTETRICS & GYNECOLOGY

## 2022-03-03 PROCEDURE — 85027 COMPLETE CBC AUTOMATED: CPT

## 2022-03-03 PROCEDURE — 83615 LACTATE (LD) (LDH) ENZYME: CPT

## 2022-03-03 PROCEDURE — 84550 ASSAY OF BLOOD/URIC ACID: CPT

## 2022-03-03 PROCEDURE — 74011000250 HC RX REV CODE- 250: Performed by: ADVANCED PRACTICE MIDWIFE

## 2022-03-03 PROCEDURE — 76819 FETAL BIOPHYS PROFIL W/O NST: CPT | Performed by: OBSTETRICS & GYNECOLOGY

## 2022-03-03 PROCEDURE — 59025 FETAL NON-STRESS TEST: CPT

## 2022-03-03 PROCEDURE — 74011250637 HC RX REV CODE- 250/637: Performed by: ADVANCED PRACTICE MIDWIFE

## 2022-03-03 PROCEDURE — 36415 COLL VENOUS BLD VENIPUNCTURE: CPT

## 2022-03-03 PROCEDURE — 80053 COMPREHEN METABOLIC PANEL: CPT

## 2022-03-03 RX ADMIN — NYSTATIN: 100000 CREAM TOPICAL at 08:22

## 2022-03-03 RX ADMIN — Medication 1 TABLET: at 08:22

## 2022-03-03 RX ADMIN — FERROUS SULFATE TAB 325 MG (65 MG ELEMENTAL FE) 325 MG: 325 (65 FE) TAB at 12:45

## 2022-03-03 RX ADMIN — SODIUM CHLORIDE, PRESERVATIVE FREE 10 ML: 5 INJECTION INTRAVENOUS at 05:30

## 2022-03-03 NOTE — PROGRESS NOTES
Bedside shift change report given to Mine Tony, RN and Adelita Garcia, RN (oncoming nurse) by Cherie Benitez RN (offgoing nurse). Report included the following information SBAR, Kardex, Intake/Output, MAR, Accordion, Recent Results, Med Rec Status and Alarm Parameters . [id number] Interpretor #[id number] used for translation needs. [address] to schedule an appointment for today. [address] [place] [address] #[id number] used for translation needs regarding discharge. [address] #[id number] used with midwife at bedside    1800 Labs drawn and sent down    1300 [address] Interpretor #[id number] used to provide discharge instructions. I have reviewed discharge instructions with the patient. The patient verbalized understanding.

## 2022-03-03 NOTE — PROGRESS NOTES
High Risk Obstetrics Progress Note    Name: Olga Paz MRN: [id number]  SSN: [id number]    YOB: 2000  Age: 22 y.o. Sex: female      Subjective:      LOS: 3 days    Estimated Date of Delivery: 3/30/22   Gestational Age Today: 43w3d     Patient admitted for preeclampsia. States she does not have abdominal pain  , chest pain, contractions, fever, headache , nausea and vomiting, pelvic pressure, right upper quadrant pain  , shortness of breath, swelling, vaginal bleeding , vaginal leaking of fluid  and visual disturbances. Objective:     Vitals:  Blood pressure (!) 135/97, pulse 91, temperature 98.1 °F (36.7 °C), resp. rate 18, last menstrual period 2021, SpO2 98 %. Temp (24hrs), Av.9 °F (36.6 °C), Min:97.7 °F (36.5 °C), Max:98.1 °F (15.1 °C)    Systolic (50VFA), SPA:495 , Min:133 , RDO:653      Diastolic (80GOF), DZI:34, Min:72, Max:104     RECOMMENDATIONS:     - Stable for outpatient management  - Check CBC, CMP prior to discharging patient  - Follow up with MFM on Monday 3/7 for BPP, blood pressure check  - Recommend delivery at 37 weeks gestation or sooner if she has severe preeclampsia  - Fetal status reassuring     Marycruz Haines MD  Maternal Fetal Medicine      Intake and Output:         Physical Exam:  Patient without distress. Heart: Regular rate   Lung: normal respiratory effort  Abdomen: soft, nontender  Fundus: soft and non tender  Lower Extremities: no redness or tenderness       Membranes:  Intact    Uterine Activity:  None    Fetal Heart Rate:  Baseline: 130 per minute  Variability: moderate  Accelerations: yes  Decelerations: none        Labs: No results found for this or any previous visit (from the past 36 hour(s)). Assessment and Plan: Active Problems:    35 weeks gestation of pregnancy (2022)      Preeclampsia, third trimester (2022)       Per MFM - pt to be discharged home tonight.  Using  \"Felix\" Reviewed s/sx concerns and when to return to hospital. Pt and family verbalized understanding.      Pt desries d/c ASAP  Repeat labs today prior to d/c   Follow up apt Monday 0845 with MFM   IOL 3/8 in the evening for cervical ripening and 3/9 for IOL  Preeclampsia:  mild  D/C home to return for any symptoms or general malause

## 2022-03-03 NOTE — PROGRESS NOTES
MATERNAL FETAL MEDICINE   PROGRESS NOTE    Louisa Chavez is a 22 y.o. female  at 36w1d admitted for preeclampsia without severe features. Patient is doing well. She denies headaches, vision changes, RUQ pain, nausea or vomiting. Patient Vitals for the past 24 hrs:   Temp Pulse Resp BP SpO2   22 1212    (!) 135/104    22 0816 97.9 °F (36.6 °C) (!) 103 16 133/72 95 %   22 0407  95  134/87    22 0020 97.7 °F (36.5 °C) 96 18 (!) 144/84 97 %   22 1955 97.9 °F (36.6 °C) 86 18 135/87 98 %   22 1530 98 °F (36.7 °C) (!) 114 16 (!) 134/98 98 %       Gen: Comfortable, NAD  Resp: Comfortable respirations  CV: Well perfused  Abd: Gravid, NT, ND  Ext: Moving all extremities well  Neuro: Alert, interactive, appropriate    No results found for this or any previous visit (from the past 24 hour(s)). ULTRASOUND  Cephalic  BPP   Umbilical artery dopplers are normal.    ASSESSMENT:    21 yo  at 36w1d with preeclampsia without severe features. Frederic Frey has no signs of severe preeclampsia. Her blood pressures are 140s/80s. She has reliable transportation to come to appointment.s    She is stable for outpatient management.      RECOMMENDATIONS:    - Stable for outpatient management  - Check CBC, CMP prior to discharging patient  - Follow up with MFM on Monday 3/7 for BPP, blood pressure check  - Recommend delivery at 37 weeks gestation or sooner if she has severe preeclampsia  - Fetal status reassuring    Jane Morales MD  Maternal Fetal Medicine

## 2022-03-03 NOTE — DISCHARGE INSTRUCTIONS
Patient Education        ???????????? ???????????????: ??????? ??????????? Pregnancy Precautions: Care Instructions  ??????? ??????? ???????????     ??????? ???? ???? ?? ???????? ???? ???????????? ???????????? ??????????? ????? ????????? ???? ???? ??? ??????? ???????? ?? ??????? ???????? ???????????? ???? ??????? ???? ??????? ???? ????? ??????? ???????? ??????? ???????????? ?? ????????, ??????? ???????? ????? ????? ????????? ? ??????? ?????? ??????? ?????????? ??????? ???????? ? ??????? ????????? (??????? ???????? ????? ???????????? ???)? ? ???????? ?????? ?????? ???????????? ??? ??????? ?????????  ???-?? ?????? ??????? ????? ??? ????????? ????? ??? ??? ??? ????????????? ??? ??? ????? ??????? ????????? ???? ??? ???????? ????????? ??????? ? ??? ????? ????????? ?? ?????????? ????? ?????? ????????? ???? ????? ??? ????? ???? ????????? ???? ?????? ??? ?? ?????? ????? ???  ??????? ???? ????? ??????? ???? ???? ???????????  · ????? ??????? ????????????? ?????? ????? ????????? ???? ?????, ??????? ???????? ??????? ??????? ???? ??????? ??????? ???????? ??????? ??????? ??????? ? ??? ????? ??? ???? ????????? ??????? ???? ??????? ? ??????? ???? ??????????????? (preeclampsia) ? ? ?????? ??? ?? ?????? ????? ? ??????? ??????? ???? ?????? ??? ?????  · ??????? ???????? ??? ????????? ????????? ?????? ??????? ????? ????? ????? ?? ???? ????? ??? ?????? ???????? ?????? ??? ????? ??? ???????? ??????, ???? ?? ??????? ??? ? ? ??? ?????? ????? ?????????? ? ???, ??????? ????? ????? ?????? ?????? ??? ????? ????????? ???? ??????????  · ??????? ?????????? ???? ??? ???????? ??????????, ???????? ????? ??????????? ?????????, ?????:  ? ????? ????? ???????  ? ??????? ??????? ????????? ??????? ????? ?????  ? ???? ????????  ? ?????? ????? ?????  ? ??????? ??????? ?? ????? ????? ???????? ???? ?? ????? ?????  · ???????? ???? ????????? ?????????? ? ???? ???? ???? ???????? ?????? ??????????  ? ???? ??????????????? ????????? ???, ????, ???, ???????? ? ???????? ?????? ????????  ? ???? ????????? ?????????? ???? ????, ???????, ???????? ????, ?????, ???????, ??????, ???????? ?????? ????? ? ?????????? ????? ??????? ?????? ????????  · ???????? ??????????? ??? ???????? ??????? ????? ???????, ????? ????????? ????????-?????? ????????? ? ????????? ?????? ???? ?????????? ????? ??????? ???????? ???? ???????? ???? ????? ???????  · ????? ?? ????????? ????????? ?????? ???????????  · ???????????? ???? ????? ???????? ??????????? ????? ?????????? ???????? ?????? ????, ??? ??????? ???? ?????????? ????? ???? ??????? ???????? ??????????  · ??? ??????? ????? ?????????? ?? ?????????? ????? ???? ??????? ????????? ??????????? ??? ???????? ?????????? ??????? ???? ? ???,??????? ???????? ???????? ????? ?????? ???? ?????????  · ??????? ??????????? ?????? ????????? ????????????, ??????? ????????? ?????? ??????? ? ??????? ??????? ?????????,?????- ????? ???????, ?????? ?????, ??-???? ??????? ??????? ??????? ???? ??????? ??????? ? ???? ?? ????????? ?????, ???????? ??????, ???? ???? ?????, ?????? ???????? ????? ?? ??????? ???? ????? ????? ??????? ???????????? ????? ???????  · ???? ???? ??? ??????????? ????? ?? ?? ?? ?????? ??????????? ??????? ????? ???? ??????? ????? ??????????? ???? ???????? ????? ???????????? (???????) ?????????  · ??????? ??????? ?????????? ????? ?????????? ?? ?????? ?? ????????????? ????? ???? ???? ??????? ??????? ??????????  ??????? ??????? ???? ????? ??? ??????????  ???????? ???? ??? ??? ??????? ?????? ?????? ???? ????? ?????? ?? ????????? 911  ? ???????? ????, ????? ???????? ?? ?????????:  · ????? ????? ???????? (????? ??????)?  · ???????? ??????? ???? ??  · ???????? ??????? ?????? ????????? ?????  · ???????? ????? ??? ?? ????? ???? ????? ???? ??  · ??????? ??????? ??? ?????? ????? ?? ?????? ???? ? ???????? ????? ????????? ????????? ???? ????????? ? ????? ???? ? ?? ???????????? ????? ????, ???????? ????? ??????? ????? ?? ????????? ?????? ??????? ??????? ??? (??????) ??????? ????? ????? ?? ???? ?????? ????? ???????? ?????????? ???? ?????? ??? ????????  ????? ????????? ????? ?? ????????? ?? ???????? ???????? ??????? ??????????, ???:  · ???????? ????-??????????????? ???????? ????, ?????:  ? ????? ??????? ??????, ??? ?? ?????? ??????????  ? ???? ???????? ????????? ???? (????? ??????????, ????? ?? ???????? ??????)?  ? ???? ????? ???????  · ???????? ???? ???? ???????? ????????? ?????  · ???????? ??? ?????? ?? ?????????  · ???????? ????? ?????  · ???????? ?? ????? ???? ?????? ?????? (????????? ?? ????? ????) ???? ????? ???? ???? ???????? ????? ??? ???????? ?????? ? ??? ?????? ?????? 1 ????? ??????? 8 ?? ??????? ??? ?? 20 ????? ??????? 4 ?? ??????? ??? ??? ??????  · ??????? ??????? ????? ??? ?????? ?????????  · ???????? ?????? ????? ???? ?????? ?? ??????? ??? ?????  · ??????? ????? ????? ???? ?????? ?? ????????? ????? ?? ????? ????? ??????????  ??????? ??????????? ???? ????????????? ???? ???????? ??????? ????????? ? ??? ???????? ????????? ???? ????? ????????? ??????? ???? ??????? ?????????:  ??????? ?? ???? ????? ???????????  ???? ????????   [email or web address]  ???????? ????????? Y951 ?????? ?? ????? ??? ?????? \"???????????? ???????????????: ??????? ???????????.\"  ?? ????? ???: 2021 06 16               ????????? ?????: 13.0  © 3098-7243 [employer]. ???????? ?????????????? ?????? ???????? ???????? ??????? ????????? ?????? ????? ??????? ??? ???????? ?? ???????? ?????? ?? ?? ?????????? ?????? ????????? ??? ???, ???? ????? ????????? ?????? ????? ????????? ???????????  [employer], ?? ??????? ?? ????????? ???????? ???? ???? ??? ???????? ?? ?????????? ???????? ??????

## 2022-03-03 NOTE — PROGRESS NOTES
Bedside and Verbal shift change report given to BRIGIDA Torres RN (oncoming nurse) by Carlos Nails RN, Yolanda Woodard RN (offgoing nurse). Report included the following information SBAR and Kardex. 2125 Pt placed on monitor    2155 CNM on call made aware of Pt having contractions Q9mins, and NST accels only meeting 10x10 CNM gave the okay to take off the monitor once NST is reactive. 2200 Pt given cold water and turned on left side, encouraged to increase fluid intake.       2240 NST reactive monitors removed

## 2022-03-03 NOTE — DISCHARGE SUMMARY
Antepartum Discharge Summary     Name: Marek Izaguirre MRN: [id number]  SSN: [id number]    YOB: 2000  Age: 22 y.o. Sex: female      Allergies: Patient has no known allergies. Admit Date: 2022    Discharge Date: 3/3/2022     Admitting Physician: Cielo Laurent CNM     Attending Physician:  Liya Gamboa CNM     * Admission Diagnoses: Preeclampsia, third trimester [O14.93]  35 weeks gestation of pregnancy [Z3A.35]    * Discharge Diagnoses:   Hospital Problems as of 3/3/2022 Date Reviewed: 2022          Codes Class Noted - Resolved POA    35 weeks gestation of pregnancy ICD-10-CM: Z3A.35  ICD-9-CM: V22.2  2022 - Present Unknown        Preeclampsia, third trimester ICD-10-CM: O14.93  ICD-9-CM: 642.43  2022 - Present Unknown             Lab Results   Component Value Date/Time    ABO/Rh(D) O POSITIVE 2021 03:49 PM    Rubella, External immune 2021 12:00 AM    GrBStrep, External NEGATIVE 2022 12:00 AM      Immunization History   Administered Date(s) Administered    HPV (9-valent) 2016, 2017    Hep A Vaccine 2016    Hep B Vaccine 2016, 2016, 2016    IPV 2016, 2017    Influenza Vaccine 2016    MMR 2016, 2016    Meningococcal (MCV4P) Vaccine 2016    Td 2016, 2016    Tdap 2017, 2022    Varicella Virus Vaccine 2016       * Discharge Condition: stable    * Procedures:   * No surgery found *      [facility] Course:    - Preeclampsia:                                     - Patient admitted with elevated blood pressure that normalized with bedrest.    - Preeclampsia labs were consistent with mild preeclampsia. - Continue bedrest.    - Continue  testing. * Disposition: Home    Discharge Medications: There are no discharge medications for this patient. * Follow-up Care/Patient Instructions:   Activity: modified bedrest  Diet: Regular Diet  Wound Care: None needed  Follow up MFM Monday at [address] [place] [address]  Follow up Monday at [facility] after MFM apt  Cervical ripening Tuesday evening  IOL 3/9    Follow-up Information     Follow up With Specialties Details Why Contact Info    Patric Martini MD Infectious Disease   [address] [place] [address] [address] [facility] [address] [phone]

## 2022-03-07 ENCOUNTER — HOSPITAL ENCOUNTER (OUTPATIENT)
Dept: PERINATAL CARE | Age: 22
Discharge: HOME | End: 2022-03-07
Attending: OBSTETRICS & GYNECOLOGY
Payer: MEDICAID

## 2022-03-07 PROCEDURE — 76819 FETAL BIOPHYS PROFIL W/O NST: CPT | Performed by: OBSTETRICS & GYNECOLOGY

## 2022-03-07 PROCEDURE — 76820 UMBILICAL ARTERY ECHO: CPT | Performed by: OBSTETRICS & GYNECOLOGY

## 2022-03-08 ENCOUNTER — HOSPITAL ENCOUNTER (INPATIENT)
Age: 22
LOS: 5 days | Discharge: HOME | DRG: 560 | End: 2022-03-13
Attending: OBSTETRICS & GYNECOLOGY | Admitting: OBSTETRICS & GYNECOLOGY
Payer: MEDICAID

## 2022-03-08 DIAGNOSIS — O13.9 GESTATIONAL HYPERTENSION, ANTEPARTUM: ICD-10-CM

## 2022-03-08 PROBLEM — Z34.90 PREGNANCY: Status: ACTIVE | Noted: 2022-03-08

## 2022-03-08 LAB
ALBUMIN SERPL-MCNC: 2.5 G/DL (ref 3.5–5)
ALBUMIN/GLOB SERPL: 0.7 {RATIO} (ref 1.1–2.2)
ALP SERPL-CCNC: 303 U/L (ref 45–117)
ALT SERPL-CCNC: 30 U/L (ref 12–78)
ANION GAP SERPL CALC-SCNC: 6 MMOL/L (ref 5–15)
AST SERPL-CCNC: 21 U/L (ref 15–37)
BILIRUB SERPL-MCNC: 0.3 MG/DL (ref 0.2–1)
BUN SERPL-MCNC: 8 MG/DL (ref 6–20)
BUN/CREAT SERPL: 12 (ref 12–20)
CALCIUM SERPL-MCNC: 8.1 MG/DL (ref 8.5–10.1)
CHLORIDE SERPL-SCNC: 111 MMOL/L (ref 97–108)
CO2 SERPL-SCNC: 20 MMOL/L (ref 21–32)
COVID-19 RAPID TEST, COVR: NOT DETECTED
CREAT SERPL-MCNC: 0.68 MG/DL (ref 0.55–1.02)
ERYTHROCYTE [DISTWIDTH] IN BLOOD BY AUTOMATED COUNT: 26.2 % (ref 11.5–14.5)
GLOBULIN SER CALC-MCNC: 3.6 G/DL (ref 2–4)
GLUCOSE SERPL-MCNC: 84 MG/DL (ref 65–100)
HCT VFR BLD AUTO: 37.2 % (ref 35–47)
HGB BLD-MCNC: 11.9 G/DL (ref 11.5–16)
MCH RBC QN AUTO: 27.7 PG (ref 26–34)
MCHC RBC AUTO-ENTMCNC: 32 G/DL (ref 30–36.5)
MCV RBC AUTO: 86.5 FL (ref 80–99)
NRBC # BLD: 0 K/UL (ref 0–0.01)
NRBC BLD-RTO: 0 PER 100 WBC
PLATELET # BLD AUTO: 198 K/UL (ref 150–400)
PMV BLD AUTO: 11.3 FL (ref 8.9–12.9)
POTASSIUM SERPL-SCNC: 4.3 MMOL/L (ref 3.5–5.1)
PROT SERPL-MCNC: 6.1 G/DL (ref 6.4–8.2)
RBC # BLD AUTO: 4.3 M/UL (ref 3.8–5.2)
SODIUM SERPL-SCNC: 137 MMOL/L (ref 136–145)
SOURCE, COVRS: NORMAL
WBC # BLD AUTO: 11.2 K/UL (ref 3.6–11)

## 2022-03-08 PROCEDURE — 74011250637 HC RX REV CODE- 250/637: Performed by: MIDWIFE

## 2022-03-08 PROCEDURE — 4A1HXCZ MONITORING OF PRODUCTS OF CONCEPTION, CARDIAC RATE, EXTERNAL APPROACH: ICD-10-PCS | Performed by: OBSTETRICS & GYNECOLOGY

## 2022-03-08 PROCEDURE — 87635 SARS-COV-2 COVID-19 AMP PRB: CPT

## 2022-03-08 PROCEDURE — 3E0DXGC INTRODUCTION OF OTHER THERAPEUTIC SUBSTANCE INTO MOUTH AND PHARYNX, EXTERNAL APPROACH: ICD-10-PCS | Performed by: OBSTETRICS & GYNECOLOGY

## 2022-03-08 PROCEDURE — 80053 COMPREHEN METABOLIC PANEL: CPT

## 2022-03-08 PROCEDURE — 65270000029 HC RM PRIVATE

## 2022-03-08 PROCEDURE — 85027 COMPLETE CBC AUTOMATED: CPT

## 2022-03-08 RX ORDER — SODIUM CHLORIDE 0.9 % (FLUSH) 0.9 %
5-40 SYRINGE (ML) INJECTION EVERY 8 HOURS
Status: DISCONTINUED | OUTPATIENT
Start: 2022-03-08 | End: 2022-03-13 | Stop reason: HOSPADM

## 2022-03-08 RX ORDER — SODIUM CHLORIDE, SODIUM LACTATE, POTASSIUM CHLORIDE, CALCIUM CHLORIDE 600; 310; 30; 20 MG/100ML; MG/100ML; MG/100ML; MG/100ML
125 INJECTION, SOLUTION INTRAVENOUS CONTINUOUS
Status: DISCONTINUED | OUTPATIENT
Start: 2022-03-08 | End: 2022-03-13 | Stop reason: HOSPADM

## 2022-03-08 RX ORDER — SODIUM CHLORIDE 0.9 % (FLUSH) 0.9 %
5-40 SYRINGE (ML) INJECTION EVERY 8 HOURS
Status: DISCONTINUED | OUTPATIENT
Start: 2022-03-08 | End: 2022-03-10 | Stop reason: HOSPADM

## 2022-03-08 RX ORDER — OXYTOCIN/RINGER'S LACTATE 30/500 ML
10 PLASTIC BAG, INJECTION (ML) INTRAVENOUS AS NEEDED
Status: DISCONTINUED | OUTPATIENT
Start: 2022-03-08 | End: 2022-03-13 | Stop reason: HOSPADM

## 2022-03-08 RX ORDER — SODIUM CHLORIDE 0.9 % (FLUSH) 0.9 %
5-40 SYRINGE (ML) INJECTION AS NEEDED
Status: DISCONTINUED | OUTPATIENT
Start: 2022-03-08 | End: 2022-03-13 | Stop reason: HOSPADM

## 2022-03-08 RX ORDER — OXYTOCIN/RINGER'S LACTATE 30/500 ML
87.3 PLASTIC BAG, INJECTION (ML) INTRAVENOUS AS NEEDED
Status: DISCONTINUED | OUTPATIENT
Start: 2022-03-08 | End: 2022-03-13 | Stop reason: HOSPADM

## 2022-03-08 RX ORDER — TERBUTALINE SULFATE 1 MG/ML
0.25 INJECTION SUBCUTANEOUS AS NEEDED
Status: DISCONTINUED | OUTPATIENT
Start: 2022-03-08 | End: 2022-03-10 | Stop reason: HOSPADM

## 2022-03-08 RX ORDER — SODIUM CHLORIDE 0.9 % (FLUSH) 0.9 %
5-40 SYRINGE (ML) INJECTION AS NEEDED
Status: DISCONTINUED | OUTPATIENT
Start: 2022-03-08 | End: 2022-03-10 | Stop reason: HOSPADM

## 2022-03-08 RX ORDER — BUTORPHANOL TARTRATE 1 MG/ML
1 INJECTION INTRAMUSCULAR; INTRAVENOUS
Status: DISCONTINUED | OUTPATIENT
Start: 2022-03-08 | End: 2022-03-10 | Stop reason: HOSPADM

## 2022-03-08 RX ADMIN — Medication 25 MCG: at 21:10

## 2022-03-08 NOTE — PROGRESS NOTES
3/8/2022  4:17 PM    1705: Assumed care from [place] [address]. Mary Rabago CNM at bedside. SVE closed. Plan to start induction with cytotec. Ok to take pt off monitor to eat dinner per SHAWANDA Joy CNM. 1955:  Bedside and Verbal shift change report given to A Kimberly Garcia RN (oncoming nurse) by Lindsay Zavala RN (offgoing nurse). Report included the following information SBAR, Kardex, MAR and Recent Results.

## 2022-03-08 NOTE — H&P
.  History & Physical    Name: Lucretia Dunlap MRN: [id number]  SSN: [id number]    YOB: 2000  Age: 22 y.o. Sex: female        Subjective:     Estimated Date of Delivery: 3/30/22  OB History    Para Term  AB Living   1             SAB IAB Ectopic Molar Multiple Live Births                    # Outcome Date GA Lbr Zeke/2nd Weight Sex Delivery Anes PTL Lv   1 Current                Ms. Ricky Pena is admitted with pregnancy at 36w6d for induction of labor. Prenatal course was complicated by preeclampsia and IUGR with AC <10th %. Please see prenatal records for details. Provider: CNMs  G1 P 0  EDC by 2nd trimester Sono  Pertinents:  Pre-ED at 29 weeks - Ante partum admission for steroids and MFM consult  IUGR by [facility]  Rash in third trimester- Torch titers neg  Late to care  IOB labs: Anemic, to start Vitron C TID, UTI---> KAE- resolved  Genetic Screening: Panorama, Horizen  Anatomy: Female, normal janny, Ant Placenta, 3VC  GTT: 181- NEEDS 3hour- HA1C - 5.1   Flu:  TDAP: 01/3/22  Rhogam:  Third Tri Labs: 9.831 - needs iron  GBS:  COVID:    Pain mgmt. in labor:  Feeding:  Circ:  Social:  Pt very stoic and quiet     Past Medical History:   Diagnosis Date    Anemia     Essential hypertension     Gestational hypertension     Headache      No past surgical history on file. Social History     Occupational History    Not on file   Tobacco Use    Smoking status: Never Smoker    Smokeless tobacco: Never Used   Vaping Use    Vaping Use: Never used   Substance and Sexual Activity    Alcohol use: No    Drug use: No    Sexual activity: Yes     Partners: Male     Birth control/protection: None     Family History   Problem Relation Age of Onset    No Known Problems Mother     Hypertension Father        No Known Allergies  Prior to Admission medications    Not on File        Review of Systems: A comprehensive review of systems was negative except for that written in the HPI.     Objective: Vitals:  Vitals:    03/08/22 1618 03/08/22 1721   BP: (!) 143/97    Pulse: 85    Resp: 18    Temp: 98.2 °F (36.8 °C)    Weight:  148 lb (67.1 kg)   Height:  5' (1.524 m)        Physical Exam:  Abdomen: soft, nontender  Fundus: soft and non tender  Perineum: blood absent, amniotic fluid absent  Cervical Exam: Closed/Thick/High  Lower Extremities:  - Edema No  Membranes:  Intact  Fetal Heart Rate: Reactive  Baseline: 140 per minute  Variability: moderate  Accelerations: yes  Decelerations: none  Uterine contractions: none    Prenatal Labs:   Lab Results   Component Value Date/Time    ABO/Rh(D) O POSITIVE 11/02/2021 03:49 PM    Rubella, External immune 11/02/2021 12:00 AM    GrBStrep, External NEGATIVE 02/22/2022 12:00 AM    HBsAg, External negative 11/02/2021 12:00 AM    RPR, External non reactive 11/02/2021 12:00 AM         Assessment/Plan:    G1 @ 36.6   IOL preeclampsia and IUGR with AC <10%  Category 1 fht  Cervix long and closed, not favorable  GBS neg  BPs elevated but not in severe range    Plan: Admit for IOL  Cytotec tonight, consider cook placement in AM.    Group B Strep was negative.

## 2022-03-09 ENCOUNTER — ANESTHESIA EVENT (OUTPATIENT)
Dept: LABOR AND DELIVERY | Age: 22
DRG: 560 | End: 2022-03-09
Payer: MEDICAID

## 2022-03-09 ENCOUNTER — ANESTHESIA (OUTPATIENT)
Dept: LABOR AND DELIVERY | Age: 22
DRG: 560 | End: 2022-03-09
Payer: MEDICAID

## 2022-03-09 PROCEDURE — 74011250636 HC RX REV CODE- 250/636: Performed by: ANESTHESIOLOGY

## 2022-03-09 PROCEDURE — 74011250637 HC RX REV CODE- 250/637: Performed by: MIDWIFE

## 2022-03-09 PROCEDURE — 74011250636 HC RX REV CODE- 250/636: Performed by: MIDWIFE

## 2022-03-09 PROCEDURE — 10907ZC DRAINAGE OF AMNIOTIC FLUID, THERAPEUTIC FROM PRODUCTS OF CONCEPTION, VIA NATURAL OR ARTIFICIAL OPENING: ICD-10-PCS | Performed by: OBSTETRICS & GYNECOLOGY

## 2022-03-09 PROCEDURE — 74011000250 HC RX REV CODE- 250: Performed by: ANESTHESIOLOGY

## 2022-03-09 PROCEDURE — 65270000029 HC RM PRIVATE

## 2022-03-09 PROCEDURE — 77030014125 HC TY EPDRL BBMI -B: Performed by: ANESTHESIOLOGY

## 2022-03-09 RX ORDER — FENTANYL CITRATE 50 UG/ML
INJECTION, SOLUTION INTRAMUSCULAR; INTRAVENOUS AS NEEDED
Status: DISCONTINUED | OUTPATIENT
Start: 2022-03-09 | End: 2022-03-10 | Stop reason: HOSPADM

## 2022-03-09 RX ORDER — FENTANYL/BUPIVACAINE/NS/PF 2-1250MCG
1-16 PREFILLED PUMP RESERVOIR EPIDURAL CONTINUOUS
Status: DISCONTINUED | OUTPATIENT
Start: 2022-03-09 | End: 2022-03-10 | Stop reason: HOSPADM

## 2022-03-09 RX ORDER — BUPIVACAINE HYDROCHLORIDE 5 MG/ML
INJECTION, SOLUTION EPIDURAL; INTRACAUDAL AS NEEDED
Status: DISCONTINUED | OUTPATIENT
Start: 2022-03-09 | End: 2022-03-10 | Stop reason: HOSPADM

## 2022-03-09 RX ORDER — BUPIVACAINE HYDROCHLORIDE 5 MG/ML
30 INJECTION, SOLUTION EPIDURAL; INTRACAUDAL AS NEEDED
Status: DISCONTINUED | OUTPATIENT
Start: 2022-03-09 | End: 2022-03-10 | Stop reason: HOSPADM

## 2022-03-09 RX ORDER — EPHEDRINE SULFATE/0.9% NACL/PF 50 MG/5 ML
10 SYRINGE (ML) INTRAVENOUS
Status: DISCONTINUED | OUTPATIENT
Start: 2022-03-09 | End: 2022-03-10 | Stop reason: HOSPADM

## 2022-03-09 RX ORDER — OXYTOCIN/RINGER'S LACTATE 30/500 ML
0-20 PLASTIC BAG, INJECTION (ML) INTRAVENOUS
Status: DISCONTINUED | OUTPATIENT
Start: 2022-03-09 | End: 2022-03-13 | Stop reason: HOSPADM

## 2022-03-09 RX ORDER — LIDOCAINE HYDROCHLORIDE AND EPINEPHRINE 15; 5 MG/ML; UG/ML
INJECTION, SOLUTION EPIDURAL AS NEEDED
Status: DISCONTINUED | OUTPATIENT
Start: 2022-03-09 | End: 2022-03-10 | Stop reason: HOSPADM

## 2022-03-09 RX ORDER — NALOXONE HYDROCHLORIDE 0.4 MG/ML
0.4 INJECTION, SOLUTION INTRAMUSCULAR; INTRAVENOUS; SUBCUTANEOUS AS NEEDED
Status: DISCONTINUED | OUTPATIENT
Start: 2022-03-09 | End: 2022-03-10 | Stop reason: HOSPADM

## 2022-03-09 RX ORDER — FENTANYL CITRATE 50 UG/ML
100 INJECTION, SOLUTION INTRAMUSCULAR; INTRAVENOUS ONCE
Status: DISPENSED | OUTPATIENT
Start: 2022-03-09 | End: 2022-03-10

## 2022-03-09 RX ADMIN — FENTANYL CITRATE 100 MCG: 50 INJECTION, SOLUTION INTRAMUSCULAR; INTRAVENOUS at 20:51

## 2022-03-09 RX ADMIN — Medication 10 ML/HR: at 21:07

## 2022-03-09 RX ADMIN — Medication 25 MCG: at 05:02

## 2022-03-09 RX ADMIN — BUPIVACAINE HYDROCHLORIDE 5 ML: 5 INJECTION, SOLUTION EPIDURAL; INTRACAUDAL; PERINEURAL at 20:51

## 2022-03-09 RX ADMIN — SODIUM CHLORIDE, POTASSIUM CHLORIDE, SODIUM LACTATE AND CALCIUM CHLORIDE 125 ML/HR: 600; 310; 30; 20 INJECTION, SOLUTION INTRAVENOUS at 09:47

## 2022-03-09 RX ADMIN — BUTORPHANOL TARTRATE 1 MG: 1 INJECTION, SOLUTION INTRAMUSCULAR; INTRAVENOUS at 18:53

## 2022-03-09 RX ADMIN — OXYTOCIN 1 MILLI-UNITS/MIN: 10 INJECTION INTRAVENOUS at 09:50

## 2022-03-09 RX ADMIN — SODIUM CHLORIDE, POTASSIUM CHLORIDE, SODIUM LACTATE AND CALCIUM CHLORIDE 125 ML/HR: 600; 310; 30; 20 INJECTION, SOLUTION INTRAVENOUS at 19:44

## 2022-03-09 RX ADMIN — Medication 25 MCG: at 01:03

## 2022-03-09 RX ADMIN — SODIUM CHLORIDE, POTASSIUM CHLORIDE, SODIUM LACTATE AND CALCIUM CHLORIDE 125 ML/HR: 600; 310; 30; 20 INJECTION, SOLUTION INTRAVENOUS at 14:33

## 2022-03-09 RX ADMIN — LIDOCAINE HYDROCHLORIDE,EPINEPHRINE BITARTRATE 4.5 ML: 15; .005 INJECTION, SOLUTION EPIDURAL; INFILTRATION; INTRACAUDAL; PERINEURAL at 22:38

## 2022-03-09 NOTE — PROGRESS NOTES
[facility] Labor Progress Note     Patient: Laura Carmen MRN: [id number]  SSN: [id number]    YOB: 2000  Age: 22 y.o. Sex: female        Subjective:   Patient coping well with contractions. Slept most of the night. S/p 3 doses of cytotec      Objective:   Patient Vitals for the past 4 hrs:   Temp Pulse BP   22 0820 97.9 °F (36.6 °C) 75 (!) 141/79       Category 1 fht  Cervix 3/70/-3  Membranes intact      Assessment:   Intrauterine pregnancy at term  Category 1 fetal heart rate tracing   Stop cytotec. Start pitocin at 0900.   May eat breakfast prior to start of pitocin      Plan:   Continue current orders/management   CNM management   Anticipate     Alfreda Miranda CNM

## 2022-03-09 NOTE — PROGRESS NOTES
Spoke with  Aston #[phone] for introduction and plan of care for patient and opened up for questions and concerns.

## 2022-03-09 NOTE — PROGRESS NOTES
5953 Bedside and Verbal shift change report given to Darrius Morataya RN (oncoming nurse) by Yumi Watts RN (offgoing nurse). Report included the following information SBAR, MAR and Recent Results    0809 CNM B [place] attempted to insert cervical ripening balloon; pt cervix 3 cm, CNM recommending to change POC to start pitocin after breakfast, pt denies questions or concerns and agrees. [place] at bedside. SVE slightly changed, AROM and IUPC recommended by CNM; pt and pt sister's questions answered and pt agrees to AROM and IUPC.    1825 Call bell requested nurse at bedside, pt requested interpretation service. Pt reports discomfort from contractions and inability to sleep, requested meds to help her sleep; discussed stadol IV, pt verbalizes understanding and agrees. 1943 Bedside and Verbal shift change report given to BHARATI Christie RN (oncoming nurse) by Darrius Morataya RN (offgoing nurse). Report included the following information SBAR, Intake/Output and MAR.      * video  used during all patient interactions above

## 2022-03-09 NOTE — PROGRESS NOTES
Labor Progress Note  Patient seen, fetal heart rate and contraction pattern evaluated, patient examined. Bhima is not feeling  Painful contractions. Visit Vitals  /84 (BP 1 Location: Right upper arm, BP Patient Position: Lying left side)   Pulse 73   Temp 98.2 °F (36.8 °C)   Resp 18   Ht 5' (1.524 m)   Wt 148 lb (67.1 kg)   LMP 06/01/2021 (Approximate)   SpO2 100%   Breastfeeding No   BMI 28.90 kg/m²       Physical Exam:  Cervical Exam:  3 cm dilated    80% effaced    -3 station    Presenting Part: cephalic  Membranes:  Intact  Uterine Activity: Frequency: Every 1-5 minutes, Duration: 40-70 seconds and Intensity: mild  Fetal Heart Rate: Baseline: 140 per minute  Variability: minimal to moderate at times  Accelerations: no  Decelerations: none    Pitocin at 7mu    Assessment/Plan:  Reassuring fetal status, Continue plan for vaginal delivery   R/b reviewed of AROM. After consent AROM performed for large amount of clear fluid.   IUPC placed

## 2022-03-09 NOTE — PROGRESS NOTES
Spoke to the  about [place] culture, and he expressed that when she is in active labor it would not be appropriate for him to be able to see her, so we should plan to turn the monitor to the wall. It is acceptable for him to hear her and he can still interpret through active labor without visualizing her.

## 2022-03-10 LAB
HIV1 P24 AG SERPL QL IA: NONREACTIVE
HIV1+2 AB SERPL QL IA: NONREACTIVE

## 2022-03-10 PROCEDURE — 87389 HIV-1 AG W/HIV-1&-2 AB AG IA: CPT

## 2022-03-10 PROCEDURE — 74011250637 HC RX REV CODE- 250/637: Performed by: ADVANCED PRACTICE MIDWIFE

## 2022-03-10 PROCEDURE — S2140 CORD BLOOD HARVESTING: HCPCS | Performed by: ADVANCED PRACTICE MIDWIFE

## 2022-03-10 PROCEDURE — 65410000002 HC RM PRIVATE OB

## 2022-03-10 PROCEDURE — 75410000003 HC RECOV DEL/VAG/CSECN EA 0.5 HR

## 2022-03-10 PROCEDURE — 74011250636 HC RX REV CODE- 250/636: Performed by: MIDWIFE

## 2022-03-10 PROCEDURE — 59400 OBSTETRICAL CARE: CPT | Performed by: ADVANCED PRACTICE MIDWIFE

## 2022-03-10 PROCEDURE — 74011000250 HC RX REV CODE- 250: Performed by: ANESTHESIOLOGY

## 2022-03-10 PROCEDURE — 36415 COLL VENOUS BLD VENIPUNCTURE: CPT

## 2022-03-10 PROCEDURE — 74011000258 HC RX REV CODE- 258: Performed by: ADVANCED PRACTICE MIDWIFE

## 2022-03-10 PROCEDURE — 76060000078 HC EPIDURAL ANESTHESIA

## 2022-03-10 PROCEDURE — 75410000002 HC LABOR FEE PER 1 HR

## 2022-03-10 PROCEDURE — 75410000000 HC DELIVERY VAGINAL/SINGLE

## 2022-03-10 PROCEDURE — 0HQ9XZZ REPAIR PERINEUM SKIN, EXTERNAL APPROACH: ICD-10-PCS | Performed by: OBSTETRICS & GYNECOLOGY

## 2022-03-10 PROCEDURE — 74011250636 HC RX REV CODE- 250/636: Performed by: ADVANCED PRACTICE MIDWIFE

## 2022-03-10 PROCEDURE — 94760 N-INVAS EAR/PLS OXIMETRY 1: CPT

## 2022-03-10 RX ORDER — PHYTONADIONE 1 MG/.5ML
INJECTION, EMULSION INTRAMUSCULAR; INTRAVENOUS; SUBCUTANEOUS
Status: DISPENSED
Start: 2022-03-10 | End: 2022-03-11

## 2022-03-10 RX ORDER — BUPIVACAINE HYDROCHLORIDE 2.5 MG/ML
INJECTION, SOLUTION EPIDURAL; INFILTRATION; INTRACAUDAL
Status: COMPLETED
Start: 2022-03-10 | End: 2022-03-10

## 2022-03-10 RX ORDER — ACETAMINOPHEN 500 MG
1000 TABLET ORAL ONCE
Status: COMPLETED | OUTPATIENT
Start: 2022-03-10 | End: 2022-03-10

## 2022-03-10 RX ORDER — IBUPROFEN 400 MG/1
800 TABLET ORAL EVERY 8 HOURS
Status: DISCONTINUED | OUTPATIENT
Start: 2022-03-10 | End: 2022-03-13 | Stop reason: HOSPADM

## 2022-03-10 RX ORDER — MISOPROSTOL 200 UG/1
600 TABLET ORAL ONCE
Status: COMPLETED | OUTPATIENT
Start: 2022-03-10 | End: 2022-03-10

## 2022-03-10 RX ORDER — MISOPROSTOL 200 UG/1
TABLET ORAL
Status: DISPENSED
Start: 2022-03-10 | End: 2022-03-11

## 2022-03-10 RX ORDER — ERYTHROMYCIN 5 MG/G
OINTMENT OPHTHALMIC
Status: DISPENSED
Start: 2022-03-10 | End: 2022-03-11

## 2022-03-10 RX ORDER — ACETAMINOPHEN 325 MG/1
650 TABLET ORAL
Status: DISCONTINUED | OUTPATIENT
Start: 2022-03-10 | End: 2022-03-13 | Stop reason: HOSPADM

## 2022-03-10 RX ORDER — NALOXONE HYDROCHLORIDE 0.4 MG/ML
0.4 INJECTION, SOLUTION INTRAMUSCULAR; INTRAVENOUS; SUBCUTANEOUS AS NEEDED
Status: DISCONTINUED | OUTPATIENT
Start: 2022-03-10 | End: 2022-03-13 | Stop reason: HOSPADM

## 2022-03-10 RX ORDER — OXYTOCIN/RINGER'S LACTATE 30/500 ML
10 PLASTIC BAG, INJECTION (ML) INTRAVENOUS AS NEEDED
Status: DISCONTINUED | OUTPATIENT
Start: 2022-03-10 | End: 2022-03-13 | Stop reason: HOSPADM

## 2022-03-10 RX ORDER — BUPIVACAINE HYDROCHLORIDE 2.5 MG/ML
INJECTION, SOLUTION EPIDURAL; INFILTRATION; INTRACAUDAL AS NEEDED
Status: DISCONTINUED | OUTPATIENT
Start: 2022-03-10 | End: 2022-03-10 | Stop reason: HOSPADM

## 2022-03-10 RX ORDER — OXYTOCIN/RINGER'S LACTATE 30/500 ML
87.3 PLASTIC BAG, INJECTION (ML) INTRAVENOUS AS NEEDED
Status: DISCONTINUED | OUTPATIENT
Start: 2022-03-10 | End: 2022-03-13 | Stop reason: HOSPADM

## 2022-03-10 RX ORDER — HYDROCORTISONE ACETATE PRAMOXINE HCL 2.5; 1 G/100G; G/100G
CREAM TOPICAL AS NEEDED
Status: DISCONTINUED | OUTPATIENT
Start: 2022-03-10 | End: 2022-03-13 | Stop reason: HOSPADM

## 2022-03-10 RX ADMIN — IBUPROFEN 800 MG: 400 TABLET ORAL at 12:54

## 2022-03-10 RX ADMIN — MISOPROSTOL 600 MCG: 200 TABLET ORAL at 12:04

## 2022-03-10 RX ADMIN — SODIUM CHLORIDE, POTASSIUM CHLORIDE, SODIUM LACTATE AND CALCIUM CHLORIDE 125 ML/HR: 600; 310; 30; 20 INJECTION, SOLUTION INTRAVENOUS at 11:10

## 2022-03-10 RX ADMIN — SODIUM CHLORIDE, POTASSIUM CHLORIDE, SODIUM LACTATE AND CALCIUM CHLORIDE 125 ML/HR: 600; 310; 30; 20 INJECTION, SOLUTION INTRAVENOUS at 05:30

## 2022-03-10 RX ADMIN — AMPICILLIN SODIUM 2 G: 2 INJECTION, POWDER, FOR SOLUTION INTRAVENOUS at 22:58

## 2022-03-10 RX ADMIN — IBUPROFEN 800 MG: 400 TABLET ORAL at 20:56

## 2022-03-10 RX ADMIN — AMPICILLIN SODIUM 2 G: 2 INJECTION, POWDER, FOR SOLUTION INTRAVENOUS at 17:20

## 2022-03-10 RX ADMIN — AMPICILLIN SODIUM 2 G: 2 INJECTION, POWDER, FOR SOLUTION INTRAVENOUS at 10:58

## 2022-03-10 RX ADMIN — ACETAMINOPHEN 1000 MG: 500 TABLET ORAL at 10:54

## 2022-03-10 RX ADMIN — GENTAMICIN 335.6 MG: 10 INJECTION, SOLUTION INTRAMUSCULAR; INTRAVENOUS at 11:56

## 2022-03-10 RX ADMIN — BUPIVACAINE HYDROCHLORIDE 8 ML: 2.5 INJECTION, SOLUTION EPIDURAL; INFILTRATION; INTRACAUDAL; PERINEURAL at 06:25

## 2022-03-10 RX ADMIN — OXYTOCIN 1 MILLI-UNITS/MIN: 10 INJECTION INTRAVENOUS at 05:06

## 2022-03-10 RX ADMIN — Medication 10 ML/HR: at 04:06

## 2022-03-10 RX ADMIN — ACETAMINOPHEN 650 MG: 325 TABLET ORAL at 16:32

## 2022-03-10 NOTE — PROGRESS NOTES
@2330 Bedside shift change report given to CHERRY East RN (oncoming nurse) by CHERRY Christie RN (offgoing nurse). Report included the following information SBAR, Kardex, Intake/Output, MAR and Recent Results. @8351-7480 Using  number [id number] to discuss emptying bladder using ye catheter vs st cath. Discussing pain and pain management options. Discussing use of PCA bolus button. Will have anesthesia come re-dose after 0250 if patient's pain is unresolved. @0300 Patient sleeping soundly    @0540 Patient is again complaining of bilateral lower abdominal and back pain. Dr. Nina Walker called for re-dose of epidural. Trying to use , disconnected multiple times. Patient is frantic at times, difficult to decipher if just pain of contractions or something more. @8773 Dr. Nina Walker at bedside for re-dose of epidural    @4184 This nurse remains at bedside using  number [phone]. Helping explain epidural and management of pain as well as reasonable expectations of pain during the labor and delivery process. Patient seems to understand. @4948 Patient sleeping soundly    @0707 SHAMEKA Contreras at bedside.  SVE 8/100/-2

## 2022-03-10 NOTE — ANESTHESIA PROCEDURE NOTES
Epidural Block    Patient location during procedure: OB  Start time: 3/9/2022 8:41 PM  End time: 3/9/2022 8:52 PM  Reason for block: labor epidural  Staffing  Performed: attending   Anesthesiologist: Gabi Carr MD  Preanesthetic Checklist  Completed: patient identified, IV checked, site marked, risks and benefits discussed, surgical consent, monitors and equipment checked, pre-op evaluation and timeout performed  Block Placement  Patient position: sitting  Prep: DuraPrep  Sterility prep: cap, drape, gloves and mask  Sedation level: no sedation  Patient monitoring: continuous pulse oximetry and heart rate  Approach: midline  Location: lumbar  Lumbar location: L3-L4  Epidural  Loss of resistance technique: air  Guidance: landmark technique  Needle  Needle type: Tuohy   Needle gauge: 17 G  Needle length: 9 cm  Needle insertion depth: 5 cm  Catheter type: end hole  Catheter size: 19 G  Catheter at skin depth: 9 cm  Catheter securement method: clear occlusive dressing and surgical tape  Test dose: negative  Assessment  Sensory level: T10  Block outcome: pain improved  Number of attempts: 1  Procedure assessment: patient tolerated procedure well with no immediate complications

## 2022-03-10 NOTE — PROGRESS NOTES
Labor Progress Note  Patient seen, fetal heart rate and contraction pattern evaluated, patient examined. Visit Vitals  /86   Pulse 99   Temp 98.6 °F (37 °C)   Resp 16   Ht 5' (1.524 m)   Wt 148 lb (67.1 kg)   LMP 06/01/2021 (Approximate)   SpO2 99%   Breastfeeding No   BMI 28.90 kg/m²       Physical Exam:  Cervical Exam:  8 cm dilated    100% effaced    -2 station    Presenting Part: cephalic  Membranes:  Artificial Rupture of Membranes; Amniotic Fluid: small amount of clear fluid  Uterine Activity: Frequency: Every 1-4 minutes  Fetal Heart Rate: Baseline: 130 per minute  Variability: moderate, periods of minimal  Accelerations: no  Decelerations: none    Assessment/Plan:  Labor  Progressing normally, Continue plan for vaginal delivery   Report given to SHAMEKA Sainz

## 2022-03-10 NOTE — L&D DELIVERY NOTE
Delivery Summary    Became Complete and +2, with strong urge to push. Pushed for ~30 min.  Live Female infant. Over Intact perineum/1st degree Right vag wall lac. Infant placed to maternal abdomen. Infant dried and stimulated no spont cry noted, cord doubly clamped and quickly cut and handed to nurse for resuscitation at warmer. Cord ph (pending) and Cord blood obtained. 3VC. 2Placenta and cord, spont Christina, Intact. Repair of 1st degree lac with 3-0 Vicryl in usual fashion. Cytotec 600 mcg SD for mild atony. /QBL pending . All counts correct yes. Mom and Baby Girl Aphia To RR, Stable. Patient: Benita Self MRN: [id number]  SSN: [id number]    YOB: 2000  Age: 22 y.o. Sex: female       Information for the patient's :  Honey Leo [[id number]]       Labor Events:    Labor: No    Steroids: None   Cervical Ripening Date/Time: 3/8/2022 9:10 PM   Cervical Ripening Type: Misoprostol   Antibiotics During Labor:     Rupture Identifier: Sac 1    Rupture Date/Time: 3/9/2022 5:29 PM   Rupture Type: AROM   Amniotic Fluid Volume: Moderate    Amniotic Fluid Description: Clear    Amniotic Fluid Odor: None    Induction:         Induction Date/Time:        Indications for Induction:      Augmentation:     Augmentation Date/Time:      Indications for Augmentation:     Labor complications:          Additional complications:        Delivery Events:  Indications For Episiotomy:     Episiotomy: None   Perineal Laceration(s): 1st   Repaired: Yes   Periurethral Laceration Location:      Repaired:     Labial Laceration Location:     Repaired:     Sulcal Laceration Location:     Repaired:     Vaginal Laceration Location:     Repaired:     Cervical Laceration Location:     Repaired:     Repair Suture: Chromic 3-0   Number of Repair Packets: 1   Estimated Blood Loss (ml):  ml   Quantitative Blood Loss (ml)                Delivery Date: 3/10/2022    Delivery Time: 11:50 AM  Delivery Type: Vaginal, Spontaneous  Sex:  Female    Gestational Age: 42w4d   Delivery Clinician:  Antwon Camp  Living Status: Living   Delivery Location: L&D LDR 11          APGARS  One minute Five minutes Ten minutes   Skin color:            Heart rate:            Grimace:            Muscle tone:            Breathing: Totals:                Presentation: Vertex    Position:        Resuscitation Method:  Tactile Stimulation;PPV;C-PAP     Meconium Stained: None      Cord Information: 3 Vessels  Complications:    Cord around:    Delayed cord clamping? No  Cord clamped date/time:3/10/2022 11:50 AM  Disposition of Cord Blood: Lab    Blood Gases Sent?: Yes    Placenta:  Date/Time: 3/10/2022 11:52 AM  Removal: Spontaneous      Appearance: Normal      Measurements:  Birth Weight: 6 lb 4.4 oz (2.845 kg)      Birth Length: 1' 8\" (0.508 m)      Head Circumference: 1' 0.99\" (0.33 m)      Chest Circumference:       Abdominal Girth: 10.83\" (0.275 m)    Other Providers:   ;HAO Bonilla;Luis DE LA VEGA, Obstetrician;Primary Nurse;Primary  Nurse;Nicu Nurse;Neonatologist;Anesthesiologist;Crna;Nurse Practitioner;Midwife;Nursery Nurse           Group B Strep:   Lab Results   Component Value Date/Time    GrBStrep, External NEGATIVE 2022 12:00 AM     Information for the patient's :  Hermes Aguirre [[id number]]   No results found for: ABORH, PCTABR, PCTDIG, BILI, ABORHEXT, ABORH     No results for input(s): PCO2CB, PO2CB, HCO3I, SO2I, IBD, PTEMPI, SPECTI, PHICB, ISITE, IDEV, IALLEN in the last 72 hours.

## 2022-03-10 NOTE — PROGRESS NOTES
1221 [facility] #[id number]  used to introduce care plan and patient is requesting epidural   2000 - Damien #[id number]  used for epidural set up and procedure and questions along with educaisamuel  2100 [facility]as [facility] #[phone]  used with Donnie Severe for SVE, explained to patient she cannot walk or move on her own nor eat.

## 2022-03-10 NOTE — PROGRESS NOTES
Patient has no HIV status listed in her chart. Loi served SHAMEKA Jensen for order for tylenol and HIV labs. 1530: Informed patient of HIV lab draw via  # [phone]: Per SHAMEKA Jensen, HIV labs drawn on 11/2/21 and were negative.

## 2022-03-10 NOTE — PROGRESS NOTES
Labor Progress Note  Patient seen, fetal heart rate and contraction pattern evaluated, patient examined. Visit Vitals  /85   Pulse (!) 101   Temp (!) 100.7 °F (38.2 °C)   Resp 18   Ht 5' (1.524 m)   Wt 148 lb (67.1 kg)   LMP 06/01/2021 (Approximate)   SpO2 99%   Breastfeeding No   BMI 28.90 kg/m²       Physical Exam:  Cervical Exam:  10 cm dilated    100% effaced    +2 station    Membranes:  Artificial Rupture of Membranes; Amniotic Fluid: small amount of thin meconium fluid  Uterine Activity: Frequency: Every 2-3 minutes and Duration: 60-90 seconds  Fetal Heart Rate: Baseline: 170 per minute  Variability: moderate  Accelerations: yes  Decelerations: none    Assessment/Plan:  Reassuring fetal status, Labor  Progressing normally, Continue plan for vaginal delivery   Cat II tracing due to tachycardia upon assessment. Temp decreased to 100F after Tylenol and antibiotics initiation. CE: 10/100/+2 with urge to push.     Ricky Godoy

## 2022-03-10 NOTE — PROGRESS NOTES
0876 Verbal shift change report given to Camila Villagomez RN (oncoming nurse) by Mayra Sage RN (offgoing nurse). Report included the following information SBAR and Intake/Output. 07[address] [facility] 59 called for update on pt status; notified CNM of pt's elevated temp, CNM to order tylenol and abx, will administer ASAP.    7987B [facility],[address] at bedside to assess pt; notified of SVE. CNM using video  to discuss pushing process with pt.    2573 Bedside and Verbal shift change report given to Brad Ramsay RN and Sally Mcfadden RN (oncoming nurse) by Camila Villagomez RN (offgoing nurse). Report included the following information SBAR, Intake/Output and MAR.

## 2022-03-10 NOTE — PROGRESS NOTES
TRANSFER - IN REPORT:    Verbal report received from CHERRY Lopez RN (name) on Benita Self being received from L&D for routine progression of care      Report consisted of patients Situation, Background, Assessment and   Recommendations(SBAR). Information from the following report(s) SBAR was reviewed with the receiving nurse. Opportunity for questions and clarification was provided. Assessment completed upon patients arrival to unit and care assumed. 1445: Discussed NB admission papers using  #[id number]: Received orders for HIV labs via SHAMEKA Owen.

## 2022-03-10 NOTE — PROGRESS NOTES
Labor Progress Note  Patient seen, fetal heart rate and contraction pattern evaluated, patient examined. She is now comfortable with epidural.  Visit Vitals  /79   Pulse 83   Temp 98.2 °F (36.8 °C)   Resp 17   Ht 5' (1.524 m)   Wt 148 lb (67.1 kg)   LMP 06/01/2021 (Approximate)   SpO2 (P) 99%   Breastfeeding No   BMI 28.90 kg/m²     Pitocin at 11 mu, IUPC in place  Physical Exam:  Cervical Exam:  4 cm dilated    100% effaced    -3 station    Presenting Part: cephalic  Membranes:  Artificial Rupture of Membranes;  Amniotic Fluid: large amount of clear fluid  Uterine Activity: Frequency: Every 1-5 minutes, Duration: 60 seconds and Intensity: moderate  Fetal Heart Rate: Baseline: 130 per minute  Variability: moderate, minimal  Accelerations: no  Decelerations: none    Assessment/Plan:  Labor  Progressing normally, Continue plan for vaginal delivery

## 2022-03-10 NOTE — ANESTHESIA PREPROCEDURE EVALUATION
Relevant Problems   CARDIOVASCULAR   (+) Gestational hypertension   (+) Preeclampsia, third trimester       Anesthetic History   No history of anesthetic complications            Review of Systems / Medical History  Patient summary reviewed, nursing notes reviewed and pertinent labs reviewed    Pulmonary  Within defined limits                 Neuro/Psych   Within defined limits           Cardiovascular    Hypertension              Exercise tolerance: >4 METS     GI/Hepatic/Renal  Within defined limits              Endo/Other  Within defined limits           Other Findings              Physical Exam    Airway  Mallampati: II  TM Distance: > 6 cm  Neck ROM: normal range of motion   Mouth opening: Normal     Cardiovascular  Regular rate and rhythm,  S1 and S2 normal,  no murmur, click, rub, or gallop             Dental  No notable dental hx       Pulmonary  Breath sounds clear to auscultation               Abdominal  GI exam deferred       Other Findings            Anesthetic Plan    ASA: 2  Anesthesia type: epidural          Induction: Intravenous  Anesthetic plan and risks discussed with: Patient

## 2022-03-11 PROCEDURE — 74011250636 HC RX REV CODE- 250/636: Performed by: ADVANCED PRACTICE MIDWIFE

## 2022-03-11 PROCEDURE — 65410000002 HC RM PRIVATE OB

## 2022-03-11 PROCEDURE — 74011000258 HC RX REV CODE- 258: Performed by: ADVANCED PRACTICE MIDWIFE

## 2022-03-11 PROCEDURE — 74011250637 HC RX REV CODE- 250/637: Performed by: ADVANCED PRACTICE MIDWIFE

## 2022-03-11 PROCEDURE — 74011250636 HC RX REV CODE- 250/636: Performed by: MIDWIFE

## 2022-03-11 RX ORDER — LABETALOL 200 MG/1
200 TABLET, FILM COATED ORAL EVERY 12 HOURS
Status: DISCONTINUED | OUTPATIENT
Start: 2022-03-11 | End: 2022-03-13 | Stop reason: HOSPADM

## 2022-03-11 RX ADMIN — IBUPROFEN 800 MG: 400 TABLET ORAL at 13:40

## 2022-03-11 RX ADMIN — AMPICILLIN SODIUM 2 G: 2 INJECTION, POWDER, FOR SOLUTION INTRAVENOUS at 05:12

## 2022-03-11 RX ADMIN — LABETALOL HYDROCHLORIDE 200 MG: 200 TABLET, FILM COATED ORAL at 09:05

## 2022-03-11 RX ADMIN — LABETALOL HYDROCHLORIDE 200 MG: 200 TABLET, FILM COATED ORAL at 21:18

## 2022-03-11 RX ADMIN — IBUPROFEN 800 MG: 400 TABLET ORAL at 05:11

## 2022-03-11 RX ADMIN — IBUPROFEN 800 MG: 400 TABLET ORAL at 21:18

## 2022-03-11 RX ADMIN — AMPICILLIN SODIUM 2 G: 2 INJECTION, POWDER, FOR SOLUTION INTRAVENOUS at 11:06

## 2022-03-11 RX ADMIN — SODIUM CHLORIDE, POTASSIUM CHLORIDE, SODIUM LACTATE AND CALCIUM CHLORIDE 125 ML/HR: 600; 310; 30; 20 INJECTION, SOLUTION INTRAVENOUS at 02:07

## 2022-03-11 NOTE — PROGRESS NOTES
Bedside and Verbal shift change report given to GURPREET Renee RN (oncoming nurse) by YULISA Cowan RN (offgoing nurse). Report included the following information SBAR.     Gilles Kinsey RN

## 2022-03-11 NOTE — PROGRESS NOTES
Postpartum Note    : Sandro [id number]  S/P , PPD#2  Ambulating and Voiding without diff  Nicholas po and po meds well  Breastfeeding and formula well established  Desires discharge home    O:  A,A&O x 3        VSS, Afebrile       Patient Vitals for the past 24 hrs:   Temp Pulse Resp BP SpO2   22 0108 97.5 °F (36.4 °C) 82 16 (!) 152/84 95 %   03/10/22 2017 97.6 °F (36.4 °C) 71 16 (!) 150/97 97 %   03/10/22 1632 97.4 °F (36.3 °C) 87 16 133/80 95 %   03/10/22 1448 98.5 °F (36.9 °C) 85 16 136/89 97 %   03/10/22 1346  92  (!) 143/85    03/10/22 1331  95  138/87    03/10/22 1316  94  (!) 144/90    03/10/22 1303  87  (!) 157/90    03/10/22 1246  (!) 101  131/81    03/10/22 1231  93  (!) 140/85    03/10/22 1216  (!) 102  (!) 146/84    03/10/22 1201  (!) 118 18 126/83 94 %   03/10/22 1125 (!) 100.7 °F (38.2 °C)       03/10/22 1000 (!) 102.5 °F (39.2 °C) (!) 101 18 127/85 99 %   03/10/22 0900   16            Breasts soft, NT       Abd soft, NT/ND       FF@ U-1, ML       Scant Lochia Rubra       Perineum Intact       Ext without REP, Neg Ran's    A/P: Routine PP care          Discharge home in stable cond. RTO 6 wks for PP exam, sooner prn          Start 200mg Labetolol BID for 150s/90s. Stop IV antibiotics 24 hours after delivery.     Rosalva [facility], [facility]

## 2022-03-11 NOTE — PROGRESS NOTES
Used  # [id number] to inform patient of new medication labetalol and that pediatrician has ordered blood cultures on infant and that we would be administering the hep B vaccine.     1337:  # [id number]

## 2022-03-11 NOTE — PROGRESS NOTES
Bedside shift change report given to YULISA Telles (oncoming nurse) by Sophia Ji. Catrina Hernandez RN (offgoing nurse). Report included the following information SBAR.

## 2022-03-12 PROCEDURE — 65410000002 HC RM PRIVATE OB

## 2022-03-12 PROCEDURE — 74011250637 HC RX REV CODE- 250/637: Performed by: ADVANCED PRACTICE MIDWIFE

## 2022-03-12 RX ORDER — DOCUSATE SODIUM 100 MG/1
100 CAPSULE, LIQUID FILLED ORAL 2 TIMES DAILY
Status: DISCONTINUED | OUTPATIENT
Start: 2022-03-12 | End: 2022-03-13 | Stop reason: HOSPADM

## 2022-03-12 RX ORDER — IBUPROFEN 800 MG/1
800 TABLET ORAL EVERY 8 HOURS
Qty: 30 TABLET | Refills: 1 | Status: SHIPPED | OUTPATIENT
Start: 2022-03-12 | End: 2022-05-09

## 2022-03-12 RX ORDER — LABETALOL 200 MG/1
200 TABLET, FILM COATED ORAL EVERY 12 HOURS
Qty: 60 TABLET | Refills: 1 | Status: SHIPPED | OUTPATIENT
Start: 2022-03-12

## 2022-03-12 RX ORDER — DOCUSATE SODIUM 100 MG/1
100 CAPSULE, LIQUID FILLED ORAL 2 TIMES DAILY
Qty: 60 CAPSULE | Refills: 2 | Status: SHIPPED | OUTPATIENT
Start: 2022-03-12 | End: 2022-05-09

## 2022-03-12 RX ADMIN — IBUPROFEN 800 MG: 400 TABLET ORAL at 06:44

## 2022-03-12 RX ADMIN — LABETALOL HYDROCHLORIDE 200 MG: 200 TABLET, FILM COATED ORAL at 09:15

## 2022-03-12 RX ADMIN — DOCUSATE SODIUM 100 MG: 100 CAPSULE, LIQUID FILLED ORAL at 06:44

## 2022-03-12 RX ADMIN — IBUPROFEN 800 MG: 400 TABLET ORAL at 17:24

## 2022-03-12 RX ADMIN — LABETALOL HYDROCHLORIDE 200 MG: 200 TABLET, FILM COATED ORAL at 20:54

## 2022-03-12 NOTE — PROGRESS NOTES
Postpartum Note    S/P , PPD#2  Ambulating and Voiding without diff  Nicholas po and po meds well  Formula well established  Desires discharge home    O:  A,A&O x 3        VSS, Afebrile       Patient Vitals for the past 24 hrs:   Temp Pulse Resp BP SpO2   22 0915 97.9 °F (36.6 °C) 92 16 122/84 98 %   22 0114 97.8 °F (36.6 °C) 98 18 124/85 96 %   22 2102 97.6 °F (36.4 °C) 94 18 (!) 138/90 96 %   22 1339 97.6 °F (36.4 °C) 90 16 121/76 98 %   22 1114  93 16 (!) 141/69           Breasts soft, NT       Abd soft, NT/ND       FF@ U-1, ML       Scant Lochia Rubra       Perineum Intact       Ext without REP, Neg Ran's    A/P: Routine PP care          Discharge home in stable cond.           RTO 1 wks for BP check          Continue labetolol 200 mg BID    Anthony [facility]

## 2022-03-12 NOTE — ROUTINE PROCESS
0800- Bedside shift change report given to s. Duane Mae, rn (oncoming nurse) by Ruba YangRN (offgoing nurse). Report included the following information SBAR.     5652- spoke with the  about needing to  blood pressure medication and asked patient to show me the medication when someone brings it to her. They are staying in the hospital until this evening with the baby. They will hopefully leave after the repeat bili.

## 2022-03-12 NOTE — DISCHARGE SUMMARY
Obstetrical Discharge Summary     Name: Selena Fong MRN: [id number]  SSN: [id number]    YOB: 2000  Age: 22 y.o. Sex: female      Admit Date: 3/8/2022    Discharge Date: 3/12/2022     Admitting Physician: Melba Reddy CNM     Attending Physician:  Keyla Phan CNM     Admission Diagnoses: Pregnancy [Z34.90]    Procedure Performed:  Epidural     Discharge Diagnoses:   Information for the patient's :  Betty Garcia [[id number]]   Delivery of a 6 lb 4.4 oz (2.845 kg) female infant via Vaginal, Spontaneous on 3/10/2022 at 11:50 AM  by Domenico Kendall. Apgars were 7  and 9 . Additional Diagnoses:   Hospital Problems  Date Reviewed: 2022          Codes Class Noted POA    Pregnancy ICD-10-CM: Z34.90  ICD-9-CM: V22.2  3/8/2022 Unknown             Lab Results   Component Value Date/Time    Rubella, External immune 2021 12:00 AM    GrBStrep, External NEGATIVE 2022 12:00 AM       Hospital Course: Postpartum course was complicated by hypertension, which added 0 days to the patient's length of stay. Patient Disposition: Home      Followup Care:  Discharge Condition: good and stable  No sex for 6 weeks  Regular Diet  Keep wound clean and dry. RTO 1 week for BP check   Continue labetolol 200mg BID    Patient Instructions: There are no discharge medications for this patient. Reference my discharge instructions. No orders of the defined types were placed in this encounter.        Signed By:  Cooper Balderas     2022

## 2022-03-13 VITALS
SYSTOLIC BLOOD PRESSURE: 118 MMHG | HEIGHT: 60 IN | HEART RATE: 92 BPM | DIASTOLIC BLOOD PRESSURE: 84 MMHG | TEMPERATURE: 97.8 F | BODY MASS INDEX: 29.06 KG/M2 | WEIGHT: 148 LBS | OXYGEN SATURATION: 98 % | RESPIRATION RATE: 16 BRPM

## 2022-03-13 PROCEDURE — 74011250637 HC RX REV CODE- 250/637: Performed by: ADVANCED PRACTICE MIDWIFE

## 2022-03-13 RX ADMIN — IBUPROFEN 800 MG: 400 TABLET ORAL at 01:27

## 2022-03-13 RX ADMIN — LABETALOL HYDROCHLORIDE 200 MG: 200 TABLET, FILM COATED ORAL at 09:12

## 2022-03-13 RX ADMIN — IBUPROFEN 800 MG: 400 TABLET ORAL at 09:12

## 2022-03-13 NOTE — PROGRESS NOTES
Postpartum Note    S/P , PPD#3  Pt's discharge was delayed last night just before MN d/t infant being David pos and needing Photo-therapy  Has Rx for Labetalol filled and ready  Ambulating and Voiding without diff  Nicholas po and po meds well  Breastfeeding well established      O:  A,A&O x 3        VSS, Afebrile       Patient Vitals for the past 24 hrs:   Temp Pulse Resp BP SpO2   22 0912 97.8 °F (36.6 °C) 92 16 118/84 98 %   22 0127 97.9 °F (36.6 °C) 100 20 (!) 136/90 99 %   22 2050 97.7 °F (36.5 °C) 89 20 (!) 146/97 100 %   22 1715 98 °F (36.7 °C) 98 16 (!) 142/89 99 %          Breasts soft, NT       Abd soft, NT/ND       FF@ U-1, ML       Scant Lochia Rubra       Perineum Intact       Ext without REP, Neg Ran's    A/P: Routine PP care          Discharge home in stable cond. RTO 1 wk for BP check            DYANA Santana/MONISHA

## 2022-03-13 NOTE — DISCHARGE INSTRUCTIONS
POSTPARTUM DISCHARGE INSTRUCTIONS       Name:  Kali Zuñiga  YOB: 2000  Admission Diagnosis:  Pregnancy [Z34.90]     Discharge Diagnosis:    Problem List as of 3/12/2022 Date Reviewed: 2/28/2022          Codes Class Noted - Resolved    Pregnancy ICD-10-CM: Z34.90  ICD-9-CM: V22.2  3/8/2022 - Present        35 weeks gestation of pregnancy ICD-10-CM: Z3A.35  ICD-9-CM: V22.2  2/28/2022 - Present        Preeclampsia, third trimester ICD-10-CM: O14.93  ICD-9-CM: 642.43  2/28/2022 - Present        Gestational hypertension ICD-10-CM: O13.9  ICD-9-CM: 642.30  2/22/2022 - Present        34 weeks gestation of pregnancy ICD-10-CM: Z3A.34  ICD-9-CM: V22.2  2/22/2022 - Present        18 weeks gestation of pregnancy ICD-10-CM: Z3A.18  ICD-9-CM: V22.2  11/2/2021 - Present    Overview Addendum 3/7/2022  8:21 AM by Michelle Sow       M APT Monday 3/7/22 0845- our office after - BRONSON CATH 3/8 (36.6) IOL 3/9 (40)  Provider: Cheko Espinoza  EDC by 2nd trimester Sono  Pertinents:  Pre-ED at 29 weeks - Ante partum admission for steroids and MFM consult  IUGR by [facility]  Rash in third trimester- Torch titers neg  Late to care  IOB labs: Anemic, to start Vitron C TID, UTI---> KAE- resolved  Genetic Screening: Panorama, Horizen  Anatomy: Female, normal janny, Ant Placenta, 3VC  GTT: 181- NEEDS 3hour- HA1C - 5.1   Flu:  TDAP: 01/3/22  Rhogam:  Third Tri Labs: 9.8/31 - needs iron  GBS:  COVID:    Pain mgmt. in labor:  Feeding:  Circ:  Social:  Pt very stoic and quiet                  Attending Physician:  Evi Cooley CNM    Delivery Type:  Vaginal Childbirth: What To Expect At Home    Your Recovery: Your body will slowly heal in the next few weeks. It is easy to get too tired and overwhelmed during the first weeks after your baby is born. Changes in your hormones can shift your mood without warning. You may find it hard to meet the extra demands on your energy and time. Take it easy on yourself.     Follow-up care is a key part of your treatment and safety. Be sure to make and go to all appointments, and call your doctor if you are having problems. It's also a good idea to know your test results and keep a list of the medicines you take. How can you care for yourself at home? Vaginal bleeding and cramps  · After delivery, you will have a bloody discharge from the vagina. This will turn pink within a week and then white or yellow after about 10 days. It may last for 2 to 4 weeks or longer, until the uterus has healed. Use pads instead of tampons until you stop bleeding. · Do not worry if you pass some blood clots, as long as they are smaller than a golf ball. If you have a tear or stitches in your vaginal area, change the pad at least every 4 hours to prevent soreness and infection. · You may have cramps for the first few days after childbirth. These are normal and occur as the uterus shrinks to normal size. Take an over-the-counter pain medicine, such as acetaminophen (Tylenol), ibuprofen (Advil, Motrin) for cramps. Read and follow all instructions on the label. Do not take aspirin, because it can cause more bleeding. Breast fullness  · Your breasts may overfill (engorge) in the first few days after delivery. To help milk flow and to relieve pain, warm your breasts in the shower or by using warm, moist towels before nursing. · If you are not nursing, do not put warmth on your breasts or touch your breasts. Wear a tight bra or sports bra and use ice until the fullness goes away. This usually takes 2 to 3 days. · Put ice or a cold pack on your breast after nursing to reduce swelling and pain. Put a thin cloth between the ice and your skin. Activity  · Eat a balanced diet. Do not try to lose weight by cutting calories. Keep taking your prenatal vitamins, or take a multivitamin. · Get as much rest as you can. Try to take naps when your baby sleeps during the day. · Get some exercise every day.  But do not do any heavy exercise until your doctor says it is okay. · Wait until you are healed (about 4 to 6 weeks) before you have sexual intercourse. Your doctor will tell you when it is okay to have sex. · Talk to your doctor about birth control. You can get pregnant even before your period returns. Also, you can get pregnant while you are breast-feeding. Mental Health  · Many women get the \"baby blues\" during the first few days after childbirth. You may lose sleep, feel irritable, and cry easily. You may feel happy one minute and sad the next. Hormone changes are one cause of these emotional changes. Also, the demands of a new baby, along with visits from relatives or other family needs, add to a mother's stress. The \"baby blues\" often peak around the fourth day. Then they ease up in less than 2 weeks. · If your moodiness or anxiety lasts for more than 2 weeks, or if you feel like life is not worth living, you may have postpartum depression. This is different for each mother. Some mothers with serious depression may worry intensely about their infant's well-being. Others may feel distant from their child. Some mothers might even feel that they might harm their baby. A mother may have signs of paranoia, wondering if someone is watching her. · With all the changes in your life, you may not know if you are depressed. Pregnancy sometimes causes changes in how you feel that are similar to the symptoms of depression. · Symptoms of depression include:  · Feeling sad or hopeless and losing interest in daily activities. These are the most common symptoms of depression. · Sleeping too much or not enough. · Feeling tired. You may feel as if you have no energy. · Eating too much or too little. · POST[employer]UM [employer] (PSI) offers a Warm line; Chat with the Expert phone sessions; Information and Articles about Pregnancy and Postpartum Mood Disorders;  Comprehensive List of Free Support Groups; Knowledgeable local coordinators who will offer support, information, and resources; Guide to Resources on Seniorlink; Calendar of events in the  mood disorders community; Latest News and Research; and [facility] [address] for [employer]. Remember - You are not alone; You are not to blame; With help, you will be well. 3-016-641-PPD(2890). WWW. POSTPARTUM. NET   · Writing or talking about death, such as writing suicide notes or talking about guns, knives, or pills. Keep the numbers for these national suicide hotlines: 4-892-789-TALK (1-184.536.7163) and 4-249-DEQJULV (5-700.771.1934). If you or someone you know talks about suicide or feeling hopeless, get help right away. Constipation and Hemorrhoids  · Drink plenty of fluids, enough so that your urine is light yellow or clear like water. If you have kidney, heart, or liver disease and have to limit fluids, talk with your doctor before you increase the amount of fluids you drink. · Eat plenty of fiber each day. Have a bran muffin or bran cereal for breakfast, and try eating a piece of fruit for a mid-afternoon snack. · For painful, itchy hemorrhoids, put ice or a cold pack on the area several times a day for 10 minutes at a time. Follow this by putting a warm compress on the area for another 10 to 20 minutes or by sitting in a shallow, warm bath. When should you call for help? Call 911 anytime you think you may need emergency care. For example, call if:  · You are thinking of hurting yourself, your baby, or anyone else. · You passed out (lost consciousness). · You have symptoms of a blood clot in your lung (called a pulmonary embolism). These may include:    · Sudden chest pain. · Trouble breathing. · Coughing up blood. Call your doctor now or seek immediate medical care if:  · You have severe vaginal bleeding. · You are soaking through a pad each hour for 2 or more hours.   · Your vaginal bleeding seems to be getting heavier or is still bright red 4 days after delivery. · You are dizzy or lightheaded, or you feel like you may faint. · You are vomiting or cannot keep fluids down. · You have a fever. · You have new or more belly pain. · You pass tissue (not just blood). · Your vaginal discharge smells bad. · Your belly feels tender or full and hard. · Your breasts are continuously painful or red. · You feel sad, anxious, or hopeless for more than a few days. · You have sudden, severe pain in your belly. · You have symptoms of a blood clot in your leg (called a deep vein thrombosis),          such as:  · Pain in your calf, back of the knee, thigh, or groin. · Redness and swelling in your leg or groin. · You have symptoms of preeclampsia, such as:  · Sudden swelling of your face, hands, or feet. · New vision problems (such as dimness or blurring). · A severe headache. · Your blood pressure is higher than it should be or rises suddenly. · You have new nausea or vomiting. Watch closely for changes in your health, and be sure to contact your doctor if you have any problems. Additional Information:  Learning About Hypertensive Disorders After Childbirth    What is preeclampsia? A woman with preeclampsia has blood pressure that is higher than usual. She may also have other serious symptoms. Preeclampsia can be dangerous. When it is severe, it can cause seizures (eclampsia) or liver or kidney damage. When the liver is affected, some women get HELLP syndrome, a blood-clotting and bleeding problem. HELLP can come on quickly and can be deadly. This is why your doctor checks you and your baby often. Preeclampsia usually occurs after 20 weeks of pregnancy. In rare cases, it is first noted right after childbirth. Most often, it starts near the end of pregnancy and goes away after childbirth. What are the symptoms? Mild preeclampsia usually doesn't cause symptoms.  But preeclampsia can cause rapid weight gain and sudden swelling of the hands and face. Severe preeclampsia does cause symptoms. It can cause a very bad headache and trouble seeing and breathing. It also can cause belly pain. You may also urinate less than usual.    If you have new preeclampsia symptoms after you go home from the hospital, call your doctor right away. What can you expect after you have had preeclampsia? In the hospital  After the baby and the placenta are delivered, preeclampsia usually starts to improve. Most women get better in the first few days after childbirth. After having preeclampsia, you still have a risk of seizures for a day or more after childbirth. (Very rarely, seizures happen later on.) So your doctor may have you take magnesium sulfate for a day or more to prevent seizures. You may also take medicine to lower your blood pressure. When you go home  Your blood pressure will most likely return to normal a few days after delivery. Your doctor will want to check your blood pressure sometime in the first week after you leave the hospital.    Some women still have high blood pressure 6 weeks after childbirth. But most return to normal levels over the long term. · Take and record your blood pressure at home if your doctor tells you to. · Learn the importance of the two measures of blood pressure (such as 120 over 80, or 120/80). The first number is the systolic pressure. This is the force of blood on the artery walls as the heart pumps. The second number is the diastolic pressure. This is the force of blood on the artery walls between heartbeats, when the heart is at rest. You have a choice of monitors to use. Manual monitor: You pump up the cuff and use a stethoscope to listen for your  Pulse. · Electronic monitor: The cuff inflates, and a gauge shows your pulse rate. · To take your blood pressure:  · Ask your doctor to check your blood pressure monitor to be sure that it is accurate and that the cuff fits you.  Also ask your doctor to watch you use it, to make sure that you are using it right. · You should not eat, use tobacco products, or use medicine known to raise blood pressure (such as some nasal decongestant sprays) before you take your blood pressure. · Avoid taking your blood pressure if you have just exercised or are nervous or upset. Rest at least 15 minutes before you take your blood pressure. · Be safe with medicines. If you take medicine, take it exactly as prescribed. Call your doctor if you think you are having a problem with your medicine. · Do not smoke. Quitting smoking will help lower your blood pressure and improve your baby's growth and health. If you need help quitting, talk to your doctor about stop-smoking programs and medicines. These can increase your chances of quitting for good. · Eat a balanced and healthy diet that has lots of fruits and vegetables. Long-term health   After you have had preeclampsia, you have a higher-than-average risk of heart disease, stroke, and kidney disease. This may be because the same things that cause preeclampsia also cause heart and kidney disease. To protect your health, work with your doctor on living a heart-healthy lifestyle and getting the checkups you need. Your doctor may also want you to check your blood pressure at home. Follow-up care is a key part of your treatment and safety. Be sure to make and go to all appointments, and call your doctor if you are having problems. It's also a good idea to know your test results and keep a list of the medicines you take. These are general instructions for a healthy lifestyle:    No smoking/ No tobacco products/ Avoid exposure to second hand smoke  Surgeon General's Warning:  Quitting smoking now greatly reduces serious risk to your health.   Obesity, smoking, and sedentary lifestyle greatly increases your risk for illness  A healthy diet, regular physical exercise & weight monitoring are important for maintaining a healthy lifestyle    Recognize signs and symptoms of STROKE:  F-face looks uneven  A-arms unable to move or move unevenly  S-speech slurred or non-existent  T-time-call 911 as soon as signs and symptoms begin - DO NOT go       back to bed or wait to see if you get better - TIME IS BRAIN. I have had the opportunity to make my options or choices for discharge. I have received and understand these instructions.

## 2022-03-13 NOTE — ROUTINE PROCESS
0605 Bedside shift change report given to [facility] (oncoming nurse) by [facility] (offgoing nurse). Report included the following information SBAR, Kardex, Intake/Output, MAR and Recent Results. Dr Bety Hirsch pediatrician and resident Dr Mario Naranjo at bedside informing mother of need to restart phototherapy with follow up labwork. Mother reported infant has V/D (no diarrhea noted in diapers). Discussed feeding guidelines, as mother may be overfeeding infant in amount and frequency and not burping infant. Mother states she has carseat and crib for infant, stating \"everything is in the car. \" [facility] audio interpretor Diego Mims #[id number] utilized. [address] interpretor Sukumar Singh  #[id number] for assessment/VS/ and updates. No female  available when requested. Poor connection that was ultimately lost. Unable to complete EPDS at this time and will retry. [address] for mother's EPDS, infant's labwork. Inturrupted connection and made another call, speaking with  Xiao Arechiga #[id number]. Female  not available. [address], requesting female , Xiao Arechiga #[id number] for EPDS and to review mother's DC instructions. Female  still not available. 1600 Uncle and sister here to transport pt and baby home. Mother stated Amrtin Limb was already in his car.  Pt transported off unit per w/c holding infant in her lap accompanied by sister and SO.

## 2022-03-13 NOTE — PROGRESS NOTES
SBAR report received from [employer]. per Supervisor instruction, Discharge on patient to be cancelled.

## 2022-03-18 PROBLEM — Z3A.35 35 WEEKS GESTATION OF PREGNANCY: Status: ACTIVE | Noted: 2022-02-28

## 2022-03-18 PROBLEM — O13.9 GESTATIONAL HYPERTENSION: Status: ACTIVE | Noted: 2022-02-22

## 2022-03-19 PROBLEM — Z3A.18 18 WEEKS GESTATION OF PREGNANCY: Status: ACTIVE | Noted: 2021-11-02

## 2022-03-19 PROBLEM — Z3A.34 34 WEEKS GESTATION OF PREGNANCY: Status: ACTIVE | Noted: 2022-02-22

## 2022-03-19 PROBLEM — O14.93 PREECLAMPSIA, THIRD TRIMESTER: Status: ACTIVE | Noted: 2022-02-28

## 2022-03-19 PROBLEM — Z34.90 PREGNANCY: Status: ACTIVE | Noted: 2022-03-08

## 2022-03-30 ENCOUNTER — OFFICE VISIT (OUTPATIENT)
Dept: OBGYN CLINIC | Age: 22
End: 2022-03-30

## 2022-03-30 VITALS
HEIGHT: 60 IN | DIASTOLIC BLOOD PRESSURE: 90 MMHG | SYSTOLIC BLOOD PRESSURE: 124 MMHG | BODY MASS INDEX: 25.13 KG/M2 | WEIGHT: 128 LBS

## 2022-03-30 DIAGNOSIS — I10 ELEVATED BLOOD PRESSURE READING WITH DIAGNOSIS OF HYPERTENSION: Primary | ICD-10-CM

## 2022-03-30 NOTE — PROGRESS NOTES
Pt is here per SHAMEKA Marie request, for BP check. Pt states she was discharged on 3/13/22 after delivery of her baby. Was told to stay on the Labetalol 200mg BID but she stopped it on 3/15/22 because of side effects (nausea, dizziness, light-headedness). She does not have a BP cuff at home. Her BP in office today is 124/90 and she has a slight headache but has not taken anything for it. Advised OTC Tylenol or Motrin. Per Linwood Feliz CNM, ok to stay off medicine for now and recheck BP in 2 weeks. Appt scheduled for 4/14/22 at 1:30. Call office PRN for any high BP symptoms. [facility]  was present for this visit and pt stated understanding of instructions and appt.

## 2022-04-14 ENCOUNTER — OFFICE VISIT (OUTPATIENT)
Dept: OBGYN CLINIC | Age: 22
End: 2022-04-14
Payer: MEDICAID

## 2022-04-14 VITALS
BODY MASS INDEX: 24.35 KG/M2 | DIASTOLIC BLOOD PRESSURE: 74 MMHG | HEIGHT: 60 IN | WEIGHT: 124 LBS | SYSTOLIC BLOOD PRESSURE: 128 MMHG

## 2022-04-14 PROCEDURE — 0503F POSTPARTUM CARE VISIT: CPT | Performed by: ADVANCED PRACTICE MIDWIFE

## 2022-04-14 NOTE — PROGRESS NOTES
Aiden Varma is a 22 y.o. female who presents for 4 week post partum blood pressure check. She is no longer taking the labetalol. Complaining of constant headaches. Motrin and Tylenol are providing no relief. Her relevant past medical history:   Past Medical History:   Diagnosis Date    Anemia     Essential hypertension     Gestational hypertension     Headache         No past surgical history on file. Social History     Occupational History    Not on file   Tobacco Use    Smoking status: Never Smoker    Smokeless tobacco: Never Used   Vaping Use    Vaping Use: Never used   Substance and Sexual Activity    Alcohol use: No    Drug use: No    Sexual activity: Yes     Partners: Male     Birth control/protection: None     Family History   Problem Relation Age of Onset    No Known Problems Mother     Hypertension Father        No Known Allergies  Prior to Admission medications    Medication Sig Start Date End Date Taking? Authorizing Provider   docusate sodium (COLACE) 100 mg capsule Take 1 Capsule by mouth two (2) times a day for 90 days. Indications: constipation  Patient not taking: Reported on 3/30/2022 3/12/22 6/10/22  Norma Matthews CNM   ibuprofen (MOTRIN) 800 mg tablet Take 1 Tablet by mouth every eight (8) hours. Indications: pain  Patient not taking: Reported on 3/30/2022 3/12/22   Norma Matthews CNM   labetaloL (NORMODYNE) 200 mg tablet Take 1 Tablet by mouth every twelve (12) hours.  Indications: high blood pressure, pre-existing hypertension during pregnancy  Patient not taking: Reported on 3/30/2022 3/12/22   Norma Matthews CNM        Review of Systems - History obtained from the patient  Constitutional: negative for weight loss, fever, night sweats  HEENT: negative for hearing loss, earache, congestion, snoring, sorethroat  CV: negative for chest pain, palpitations, edema  Resp: negative for cough, shortness of breath, wheezing  Breast: negative for breast lumps, nipple discharge, galactorrhea  GI: negative for change in bowel habits, abdominal pain, black or bloody stools  : negative for frequency, dysuria, hematuria  MSK: negative for back pain, joint pain, muscle pain  Skin: negative for itching, rash, hives  Neuro: negative for dizziness, headache, confusion, weakness  Psych: negative for anxiety, depression, change in mood  Heme/lymph: negative for bleeding, bruising, pallor      Objective:  Visit Vitals  /74   Ht 5' (1.524 m)   Wt 124 lb (56.2 kg)   LMP 06/01/2021 (Approximate)   BMI 24.22 kg/m²          PHYSICAL EXAMINATION    Constitutional  · Appearance: well-nourished, well developed, alert, in no acute distress    HENT  · Head and Face: appears normal    Neck  · Inspection/Palpation: normal appearance  ·   Skin  · General Inspection: no rash, no lesions identified    Neurologic/Psychiatric  · Mental Status:  · Orientation: grossly oriented to person, place and time  · Mood and Affect: mood normal, affect appropriate    Assessment/Plan:   HA consistent since birth 5/10 does not improve or worsen - consistently the same regardless of medication or blood pressure readings- recommend   -chiropractor, magnesium 250-300 mg daily, sending in short course of fioricet     Follow up in 2 weeks to see if things are improving  Pt desires IUD placement at that time, reviewed options she desires Rene Benson        Patient declines presence of chaperone during today's visit.      Allie Dudley CNM

## 2022-04-29 ENCOUNTER — ROUTINE PRENATAL (OUTPATIENT)
Dept: OBGYN CLINIC | Age: 22
End: 2022-04-29
Payer: MEDICAID

## 2022-04-29 VITALS — WEIGHT: 130 LBS | DIASTOLIC BLOOD PRESSURE: 76 MMHG | SYSTOLIC BLOOD PRESSURE: 126 MMHG | BODY MASS INDEX: 25.39 KG/M2

## 2022-04-29 DIAGNOSIS — Z30.430 ENCOUNTER FOR INSERTION OF INTRAUTERINE CONTRACEPTIVE DEVICE (IUD): Primary | ICD-10-CM

## 2022-04-29 DIAGNOSIS — Z30.430 ENCOUNTER FOR IUD INSERTION: ICD-10-CM

## 2022-04-29 LAB
HCG URINE, QL. (POC): NEGATIVE
VALID INTERNAL CONTROL?: YES

## 2022-04-29 PROCEDURE — 81025 URINE PREGNANCY TEST: CPT | Performed by: MIDWIFE

## 2022-04-29 PROCEDURE — 58300 INSERT INTRAUTERINE DEVICE: CPT | Performed by: MIDWIFE

## 2022-04-29 NOTE — PROGRESS NOTES
Pt presents for IUD insertion. Questions answered, benefits/risks/side effects reviewed, consents signed. Urine HCG negative. Vagina and vulva are normal;  no discharge is noted. Cervix normal without lesions. Uterus midline and mobile, normal in size and shape without tenderness. Adnexa normal in size without masses or tenderness. Speculum inserted, cervix visualized. Cervix cleansed with betadine x 3. Lidocaine gel applied to cervix. Uterus sounded to 5 cm. Mirena IUD inserted without difficulty under sterile technique. Strings trimmed to 4 cm. Patient tolerated procedure well.

## 2022-04-29 NOTE — PROGRESS NOTES
Mirena IUD  Lot: [id number]  Exp: 06/30/2024  NDC: 72426-230-74    [facility]  OFFICE PROCEDURE PROGRESS NOTE        Chart reviewed for the following:   Taco Montgomery LPN, have reviewed the History, Physical and updated the Allergic reactions for [facility] performed immediately prior to start of procedure:   Taco Montgomery LPN, have performed the following reviews on [address] [place] [address] prior to the start of the procedure:            * Patient was identified by name and date of birth   * Agreement on procedure being performed was verified  * Risks and Benefits explained to the patient  * Procedure site verified and marked as necessary  * Patient was positioned for comfort  * Consent was signed and verified     Time: 7627      Date of procedure: 4/29/2022    Procedure performed by:  Navya Chan CNM    Provider assisted by: JUAN M Samuel LPN    Patient assisted by: self    How tolerated by patient: tolerated the procedure well with no complications    Post Procedural Pain Scale: 2 - Hurts Little Bit    Comments: none

## 2022-05-06 NOTE — PATIENT INSTRUCTIONS
Learning About Birth Control: Intrauterine Device (IUD)  What is an intrauterine device (IUD)? The intrauterine device (IUD) is used to prevent pregnancy. It's a small, plastic, T-shaped device. Your doctor places the IUD in your uterus. If you and your doctor discuss it before you give birth, this can be done right after you have your baby. You have a choice between a hormonal IUD and a copper IUD. The hormonal IUD can prevent pregnancy for 3 to 6 years, depending on which IUD is used. But your doctor may talk to you about leaving it in for longer. When you have it, you don't have to do anything else to prevent pregnancy. The copper IUD can prevent pregnancy for 10 years. But your doctor may talk to you about leaving it in for longer. When you have it, you don't have to do anything else to prevent pregnancy. A string tied to the end of the IUD hangs down through the opening of the uterus (called the cervix) into the vagina. You can check that the IUD is in place by feeling for the string. The IUD usually stays in the uterus until your doctor removes it. How well does an IUD for birth control work? In the first year of use:  · When the hormonal IUD is used exactly as directed, fewer than 1 out of 100 women have an unplanned pregnancy. · When the copper IUD is used exactly as directed, fewer than 1 out of 100 women have an unplanned pregnancy. Be sure to tell your doctor about any health problems you have or medicines you take. Your doctor can help you choose the birth control method that's right for you. What are the advantages of an IUD? · An IUD is one of the most effective methods of birth control. · It prevents pregnancy for 3 to 10 years, depending on the type. You don't have to worry about birth control during this time. · It's safe to use while breastfeeding. · IUDs don't contain estrogen.  So you can use an IUD if you don't want to take estrogen or can't take estrogen because you have certain health problems or concerns. · An IUD is convenient. It is always providing birth control. You don't need to remember to take a pill or get a shot. You don't have to interrupt sex to protect against pregnancy. · A hormonal IUD may reduce heavy bleeding and cramping. What are the disadvantages of an IUD? · An IUD doesn't protect against sexually transmitted infections (STIs), such as herpes or HIV/AIDS. If you aren't sure if your sex partner might have an STI, use a condom to protect against disease. · A copper IUD may cause periods with more bleeding and cramping. · You have to see a doctor to have an IUD inserted and removed. Where can you learn more? Go to http://www.gray.com/  Enter B486 in the search box to learn more about \"Learning About Birth Control: Intrauterine Device (IUD). \"  Current as of: June 16, 2021               Content Version: 13.2  © 2006-2022 Healthwise, [facility]. Care instructions adapted under license by [employer] (which disclaims liability or warranty for this information). If you have questions about a medical condition or this instruction, always ask your healthcare professional. [address] any warranty or liability for your use of this information.

## 2022-05-09 ENCOUNTER — OFFICE VISIT (OUTPATIENT)
Dept: OBGYN CLINIC | Age: 22
End: 2022-05-09
Payer: MEDICAID

## 2022-05-09 VITALS
SYSTOLIC BLOOD PRESSURE: 112 MMHG | BODY MASS INDEX: 25.72 KG/M2 | WEIGHT: 131 LBS | HEIGHT: 60 IN | DIASTOLIC BLOOD PRESSURE: 60 MMHG

## 2022-05-09 DIAGNOSIS — Z30.431 ENCOUNTER FOR ROUTINE CHECKING OF INTRAUTERINE CONTRACEPTIVE DEVICE (IUD): Primary | ICD-10-CM

## 2022-05-09 PROCEDURE — 99212 OFFICE O/P EST SF 10 MIN: CPT | Performed by: MIDWIFE

## 2023-03-14 ENCOUNTER — HOSPITAL ENCOUNTER (EMERGENCY)
Age: 23
Discharge: HOME | End: 2023-03-15
Attending: STUDENT IN AN ORGANIZED HEALTH CARE EDUCATION/TRAINING PROGRAM
Payer: MEDICAID

## 2023-03-14 DIAGNOSIS — N83.209 RUPTURE OF OVARIAN CYST: ICD-10-CM

## 2023-03-14 DIAGNOSIS — R10.813 RIGHT LOWER QUADRANT ABDOMINAL TENDERNESS WITHOUT REBOUND TENDERNESS: Primary | ICD-10-CM

## 2023-03-14 LAB
ALBUMIN SERPL-MCNC: 3.8 G/DL (ref 3.5–5)
ALBUMIN/GLOB SERPL: 0.8 (ref 1.1–2.2)
ALP SERPL-CCNC: 70 U/L (ref 45–117)
ALT SERPL-CCNC: 41 U/L (ref 12–78)
ANION GAP SERPL CALC-SCNC: 6 MMOL/L (ref 5–15)
AST SERPL-CCNC: 29 U/L (ref 15–37)
BASOPHILS # BLD: 0 K/UL (ref 0–0.1)
BASOPHILS NFR BLD: 0 % (ref 0–1)
BILIRUB SERPL-MCNC: 0.4 MG/DL (ref 0.2–1)
BUN SERPL-MCNC: 5 MG/DL (ref 6–20)
BUN/CREAT SERPL: 6 (ref 12–20)
CALCIUM SERPL-MCNC: 8.6 MG/DL (ref 8.5–10.1)
CHLORIDE SERPL-SCNC: 104 MMOL/L (ref 97–108)
CO2 SERPL-SCNC: 26 MMOL/L (ref 21–32)
COMMENT, HOLDF: NORMAL
CREAT SERPL-MCNC: 0.81 MG/DL (ref 0.55–1.02)
DIFFERENTIAL METHOD BLD: ABNORMAL
EOSINOPHIL # BLD: 0.2 K/UL (ref 0–0.4)
EOSINOPHIL NFR BLD: 3 % (ref 0–7)
ERYTHROCYTE [DISTWIDTH] IN BLOOD BY AUTOMATED COUNT: 14.7 % (ref 11.5–14.5)
GLOBULIN SER CALC-MCNC: 4.8 G/DL (ref 2–4)
GLUCOSE SERPL-MCNC: 93 MG/DL (ref 65–100)
HCT VFR BLD AUTO: 38.7 % (ref 35–47)
HGB BLD-MCNC: 12.5 G/DL (ref 11.5–16)
IMM GRANULOCYTES # BLD AUTO: 0 K/UL (ref 0–0.04)
IMM GRANULOCYTES NFR BLD AUTO: 0 % (ref 0–0.5)
LIPASE SERPL-CCNC: 66 U/L (ref 73–393)
LYMPHOCYTES # BLD: 0.6 K/UL (ref 0.8–3.5)
LYMPHOCYTES NFR BLD: 10 % (ref 12–49)
MCH RBC QN AUTO: 27.5 PG (ref 26–34)
MCHC RBC AUTO-ENTMCNC: 32.3 G/DL (ref 30–36.5)
MCV RBC AUTO: 85.2 FL (ref 80–99)
MONOCYTES # BLD: 0.4 K/UL (ref 0–1)
MONOCYTES NFR BLD: 6 % (ref 5–13)
NEUTS SEG # BLD: 5.2 K/UL (ref 1.8–8)
NEUTS SEG NFR BLD: 81 % (ref 32–75)
NRBC # BLD: 0 K/UL (ref 0–0.01)
NRBC BLD-RTO: 0 PER 100 WBC
PLATELET # BLD AUTO: 189 K/UL (ref 150–400)
PMV BLD AUTO: 9.8 FL (ref 8.9–12.9)
POTASSIUM SERPL-SCNC: 3.4 MMOL/L (ref 3.5–5.1)
PROT SERPL-MCNC: 8.6 G/DL (ref 6.4–8.2)
RBC # BLD AUTO: 4.54 M/UL (ref 3.8–5.2)
RBC MORPH BLD: ABNORMAL
SAMPLES BEING HELD,HOLD: NORMAL
SODIUM SERPL-SCNC: 136 MMOL/L (ref 136–145)
WBC # BLD AUTO: 6.4 K/UL (ref 3.6–11)

## 2023-03-14 PROCEDURE — 83690 ASSAY OF LIPASE: CPT

## 2023-03-14 PROCEDURE — 99285 EMERGENCY DEPT VISIT HI MDM: CPT

## 2023-03-14 PROCEDURE — 84484 ASSAY OF TROPONIN QUANT: CPT

## 2023-03-14 PROCEDURE — 93005 ELECTROCARDIOGRAM TRACING: CPT

## 2023-03-14 PROCEDURE — 80053 COMPREHEN METABOLIC PANEL: CPT

## 2023-03-14 PROCEDURE — 84703 CHORIONIC GONADOTROPIN ASSAY: CPT

## 2023-03-14 PROCEDURE — 85025 COMPLETE CBC W/AUTO DIFF WBC: CPT

## 2023-03-14 PROCEDURE — 36415 COLL VENOUS BLD VENIPUNCTURE: CPT

## 2023-03-14 NOTE — Clinical Note
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 She is medically clear to return to work/school on 3/17/2023.        Sincerely,          Humphrey Marino NP

## 2023-03-14 NOTE — Clinical Note
[address]  [address] [facility] [address]  [phone]    Work/School Note    Date: 3/14/2023    To Whom It May concern:    Faizan Leyva was seen and treated today in the emergency room by the following provider(s):  Attending Provider: Eliot Harper DO  Nurse Practitioner: Evelio Gilbert NP. Faizan Leyva is excused from work/school on 03/15/23 and 03/16/23. She is medically clear to return to work/school on 3/17/2023.        Sincerely,          Melissa Henderson RN

## 2023-03-15 ENCOUNTER — APPOINTMENT (OUTPATIENT)
Dept: CT IMAGING | Age: 23
End: 2023-03-15
Attending: NURSE PRACTITIONER
Payer: MEDICAID

## 2023-03-15 ENCOUNTER — APPOINTMENT (OUTPATIENT)
Dept: GENERAL RADIOLOGY | Age: 23
End: 2023-03-15
Attending: NURSE PRACTITIONER
Payer: MEDICAID

## 2023-03-15 VITALS
OXYGEN SATURATION: 97 % | RESPIRATION RATE: 18 BRPM | TEMPERATURE: 98.2 F | HEART RATE: 87 BPM | SYSTOLIC BLOOD PRESSURE: 100 MMHG | DIASTOLIC BLOOD PRESSURE: 70 MMHG

## 2023-03-15 LAB
APPEARANCE UR: CLEAR
ATRIAL RATE: 104 BPM
BACTERIA URNS QL MICRO: NEGATIVE /HPF
BILIRUB UR QL: NEGATIVE
CALCULATED P AXIS, ECG09: 53 DEGREES
CALCULATED R AXIS, ECG10: 68 DEGREES
CALCULATED T AXIS, ECG11: 39 DEGREES
COLOR UR: NORMAL
DIAGNOSIS, 93000: NORMAL
EPITH CASTS URNS QL MICRO: NORMAL /LPF
GLUCOSE UR STRIP.AUTO-MCNC: NEGATIVE MG/DL
HCG SERPL QL: NEGATIVE
HGB UR QL STRIP: NEGATIVE
HYALINE CASTS URNS QL MICRO: NORMAL /LPF (ref 0–5)
KETONES UR QL STRIP.AUTO: NEGATIVE MG/DL
LEUKOCYTE ESTERASE UR QL STRIP.AUTO: NEGATIVE
NITRITE UR QL STRIP.AUTO: NEGATIVE
P-R INTERVAL, ECG05: 120 MS
PH UR STRIP: 6.5 (ref 5–8)
PROT UR STRIP-MCNC: NEGATIVE MG/DL
Q-T INTERVAL, ECG07: 322 MS
QRS DURATION, ECG06: 66 MS
QTC CALCULATION (BEZET), ECG08: 423 MS
RBC #/AREA URNS HPF: NORMAL /HPF (ref 0–5)
SP GR UR REFRACTOMETRY: 1.01 (ref 1–1.03)
TROPONIN I SERPL HS-MCNC: <3 NG/L (ref 0–37)
UR CULT HOLD, URHOLD: NORMAL
UROBILINOGEN UR QL STRIP.AUTO: 0.2 EU/DL (ref 0.2–1)
VENTRICULAR RATE, ECG03: 104 BPM
WBC URNS QL MICRO: NORMAL /HPF (ref 0–4)

## 2023-03-15 PROCEDURE — 81001 URINALYSIS AUTO W/SCOPE: CPT

## 2023-03-15 PROCEDURE — 74011250636 HC RX REV CODE- 250/636: Performed by: NURSE PRACTITIONER

## 2023-03-15 PROCEDURE — 74011000636 HC RX REV CODE- 636: Performed by: RADIOLOGY

## 2023-03-15 PROCEDURE — 71046 X-RAY EXAM CHEST 2 VIEWS: CPT

## 2023-03-15 PROCEDURE — 74177 CT ABD & PELVIS W/CONTRAST: CPT

## 2023-03-15 PROCEDURE — 96375 TX/PRO/DX INJ NEW DRUG ADDON: CPT

## 2023-03-15 PROCEDURE — 96374 THER/PROPH/DIAG INJ IV PUSH: CPT

## 2023-03-15 RX ORDER — IBUPROFEN 800 MG/1
800 TABLET ORAL
Qty: 20 TABLET | Refills: 0 | Status: SHIPPED | OUTPATIENT
Start: 2023-03-15 | End: 2023-03-22

## 2023-03-15 RX ORDER — ONDANSETRON 2 MG/ML
4 INJECTION INTRAMUSCULAR; INTRAVENOUS
Status: COMPLETED | OUTPATIENT
Start: 2023-03-15 | End: 2023-03-15

## 2023-03-15 RX ORDER — ONDANSETRON 4 MG/1
4 TABLET, ORALLY DISINTEGRATING ORAL
Qty: 10 TABLET | Refills: 0 | Status: SHIPPED | OUTPATIENT
Start: 2023-03-15 | End: 2023-03-20

## 2023-03-15 RX ORDER — KETOROLAC TROMETHAMINE 30 MG/ML
15 INJECTION, SOLUTION INTRAMUSCULAR; INTRAVENOUS ONCE
Status: COMPLETED | OUTPATIENT
Start: 2023-03-15 | End: 2023-03-15

## 2023-03-15 RX ADMIN — KETOROLAC TROMETHAMINE 15 MG: 30 INJECTION, SOLUTION INTRAMUSCULAR at 00:47

## 2023-03-15 RX ADMIN — SODIUM CHLORIDE 1000 ML: 9 INJECTION, SOLUTION INTRAVENOUS at 00:47

## 2023-03-15 RX ADMIN — ONDANSETRON HYDROCHLORIDE 4 MG: 2 SOLUTION INTRAMUSCULAR; INTRAVENOUS at 00:47

## 2023-03-15 RX ADMIN — IOPAMIDOL 100 ML: 755 INJECTION, SOLUTION INTRAVENOUS at 02:14

## 2023-03-15 NOTE — ED TRIAGE NOTES
She reports generalized abdominal pain and n/v that began earlier this evening. She also endorses chest pain which she thinks is from the vomiting. A&OX4.

## 2023-03-15 NOTE — ED PROVIDER NOTES
# [id number]    HPI   Patient is a 25 y.o. F who presents today with complaints of abdominal pain. Patient states she has been having abdominal pain and nausea with vomiting that started this evening at 1900. No aggravating or alleviating factors. Pain is persistent but waxes and wanes in intensity. Has not checked her temperature but has chills. No dysuria, hematuria. No diarrhea, constipation, hematochezia, melena. States small specks of blood in her vomit. Has associated chest pain but no dysphagia. No history of abdominal surgery. No concern for pregnancy or STI. Has taken nothing for symptoms. There are no other complaints, changes or physical findings at this time. ALLERGIES: Patient has no known allergies. Past Medical History:   Diagnosis Date    Anemia     Essential hypertension     Gestational hypertension     Headache      No past surgical history on file. Family History:   Problem Relation Age of Onset    No Known Problems Mother     Hypertension Father      Social History     Socioeconomic History    Marital status:      Spouse name: Not on file    Number of children: Not on file    Years of education: Not on file    Highest education level: Not on file   Occupational History    Not on file   Tobacco Use    Smoking status: Never    Smokeless tobacco: Never   Vaping Use    Vaping Use: Never used   Substance and Sexual Activity    Alcohol use: No    Drug use: No    Sexual activity: Yes     Partners: Male     Birth control/protection: I.U.D.    Other Topics Concern     Service Not Asked    Blood Transfusions Not Asked    Caffeine Concern Not Asked    Occupational Exposure Not Asked    Hobby Hazards Not Asked    Sleep Concern Not Asked    Stress Concern Not Asked    Weight Concern Not Asked    Special Diet Not Asked    Back Care Not Asked    Exercise Not Asked    Bike Helmet Not Asked    Seat Belt Not Asked    Self-Exams Not Asked   Social History Narrative    Not on file     Social Determinants of Health     Financial Resource Strain: Not on file   Food Insecurity: Not on file   Transportation Needs: Not on file   Physical Activity: Not on file   Stress: Not on file   Social Connections: Not on file   Intimate Partner Violence: Not on file   Housing Stability: Not on file           Review of Systems   Constitutional:  Negative for fever. HENT:  Negative for congestion. Eyes:  Negative for discharge. Respiratory:  Negative for shortness of breath. Cardiovascular:  Negative for chest pain. Gastrointestinal:  Negative for nausea. Genitourinary:  Negative for dysuria. Musculoskeletal:  Negative for myalgias. Neurological:  Negative for seizures. Psychiatric/Behavioral:  Negative for behavioral problems. Vitals:    03/14/23 2211 03/15/23 0331   BP: 120/76 100/70   Pulse: (!) 104 87   Resp: 20 18   Temp: 99.1 °F (37.3 °C) 98.2 °F (36.8 °C)   SpO2: 100% 97%            Physical Exam  Vitals and nursing note reviewed. Constitutional:       General: She is not in acute distress. Appearance: Normal appearance. She is well-developed and normal weight. She is not ill-appearing, toxic-appearing or diaphoretic. HENT:      Head: Normocephalic and atraumatic. Eyes:      Pupils: Pupils are equal, round, and reactive to light. Cardiovascular:      Rate and Rhythm: Normal rate and regular rhythm. Heart sounds: Normal heart sounds. Pulmonary:      Effort: Pulmonary effort is normal.   Abdominal:      General: Abdomen is flat. Tenderness: There is abdominal tenderness in the right lower quadrant and epigastric area. There is guarding. There is no right CVA tenderness or left CVA tenderness. Musculoskeletal:      Cervical back: Neck supple. Skin:     General: Skin is dry. Neurological:      Mental Status: She is alert. Mental status is at baseline.    Psychiatric:         Mood and Affect: Mood normal.         Behavior: Behavior normal. LABORATORY RESULTS:  Recent Results (from the past 24 hour(s))   EKG, 12 LEAD, INITIAL    Collection Time: 03/14/23 10:08 PM   Result Value Ref Range    Ventricular Rate 104 BPM    Atrial Rate 104 BPM    P-R Interval 120 ms    QRS Duration 66 ms    Q-T Interval 322 ms    QTC Calculation (Bezet) 423 ms    Calculated P Axis 53 degrees    Calculated R Axis 68 degrees    Calculated T Axis 39 degrees    Diagnosis       Sinus tachycardia  Otherwise normal ECG  No previous ECGs available     CBC WITH AUTOMATED DIFF    Collection Time: 03/14/23 10:22 PM   Result Value Ref Range    WBC 6.4 3.6 - 11.0 K/uL    RBC 4.54 3.80 - 5.20 M/uL    HGB 12.5 11.5 - 16.0 g/dL    HCT 38.7 35.0 - 47.0 %    MCV 85.2 80.0 - 99.0 FL    MCH 27.5 26.0 - 34.0 PG    MCHC 32.3 30.0 - 36.5 g/dL    RDW 14.7 (H) 11.5 - 14.5 %    PLATELET 462 073 - 681 K/uL    MPV 9.8 8.9 - 12.9 FL    NRBC 0.0 0  WBC    ABSOLUTE NRBC 0.00 0.00 - 0.01 K/uL    NEUTROPHILS 81 (H) 32 - 75 %    LYMPHOCYTES 10 (L) 12 - 49 %    MONOCYTES 6 5 - 13 %    EOSINOPHILS 3 0 - 7 %    BASOPHILS 0 0 - 1 %    IMMATURE GRANULOCYTES 0 0.0 - 0.5 %    ABS. NEUTROPHILS 5.2 1.8 - 8.0 K/UL    ABS. LYMPHOCYTES 0.6 (L) 0.8 - 3.5 K/UL    ABS. MONOCYTES 0.4 0.0 - 1.0 K/UL    ABS. EOSINOPHILS 0.2 0.0 - 0.4 K/UL    ABS. BASOPHILS 0.0 0.0 - 0.1 K/UL    ABS. IMM.  GRANS. 0.0 0.00 - 0.04 K/UL    DF SMEAR SCANNED      RBC COMMENTS MICROCYTOSIS  1+       METABOLIC PANEL, COMPREHENSIVE    Collection Time: 03/14/23 10:22 PM   Result Value Ref Range    Sodium 136 136 - 145 mmol/L    Potassium 3.4 (L) 3.5 - 5.1 mmol/L    Chloride 104 97 - 108 mmol/L    CO2 26 21 - 32 mmol/L    Anion gap 6 5 - 15 mmol/L    Glucose 93 65 - 100 mg/dL    BUN 5 (L) 6 - 20 MG/DL    Creatinine 0.81 0.55 - 1.02 MG/DL    BUN/Creatinine ratio 6 (L) 12 - 20      eGFR >60 >60 ml/min/1.73m2    Calcium 8.6 8.5 - 10.1 MG/DL    Bilirubin, total 0.4 0.2 - 1.0 MG/DL    ALT (SGPT) 41 12 - 78 U/L    AST (SGOT) 29 15 - 37 U/L Alk. phosphatase 70 45 - 117 U/L    Protein, total 8.6 (H) 6.4 - 8.2 g/dL    Albumin 3.8 3.5 - 5.0 g/dL    Globulin 4.8 (H) 2.0 - 4.0 g/dL    A-G Ratio 0.8 (L) 1.1 - 2.2     LIPASE    Collection Time: 03/14/23 10:22 PM   Result Value Ref Range    Lipase 66 (L) 73 - 393 U/L   SAMPLES BEING HELD    Collection Time: 03/14/23 10:22 PM   Result Value Ref Range    SAMPLES BEING HELD 1RED, 1BLU     COMMENT        Add-on orders for these samples will be processed based on acceptable specimen integrity and analyte stability, which may vary by analyte. TROPONIN-HIGH SENSITIVITY    Collection Time: 03/14/23 10:22 PM   Result Value Ref Range    Troponin-High Sensitivity <3 0 - 37 ng/L   HCG QL SERUM    Collection Time: 03/14/23 10:22 PM   Result Value Ref Range    HCG, Ql. Negative NEG     URINALYSIS W/MICROSCOPIC    Collection Time: 03/15/23  1:57 AM   Result Value Ref Range    Color YELLOW/STRAW      Appearance CLEAR CLEAR      Specific gravity 1.007 1.003 - 1.030      pH (UA) 6.5 5.0 - 8.0      Protein Negative NEG mg/dL    Glucose Negative NEG mg/dL    Ketone Negative NEG mg/dL    Bilirubin Negative NEG      Blood Negative NEG      Urobilinogen 0.2 0.2 - 1.0 EU/dL    Nitrites Negative NEG      Leukocyte Esterase Negative NEG      WBC 0-4 0 - 4 /hpf    RBC 0-5 0 - 5 /hpf    Epithelial cells FEW FEW /lpf    Bacteria Negative NEG /hpf    Hyaline cast 0-2 0 - 5 /lpf   URINE CULTURE HOLD SAMPLE    Collection Time: 03/15/23  1:57 AM    Specimen: Urine   Result Value Ref Range    Urine culture hold        Urine on hold in Microbiology dept for 2 days. If unpreserved urine is submitted, it cannot be used for addtional testing after 24 hours, recollection will be required. IMAGING RESULTS:  XR CHEST PA LAT    Result Date: 3/15/2023  No acute findings. CT ABD PELV W CONT    Result Date: 3/15/2023  Small pelvic free fluid with crenated right ovarian cyst may reflect recent cyst rupture. No acute findings.      MEDICATIONS GIVEN:  Medications   iopamidoL (ISOVUE-370) 370 mg iodine /mL (76 %) injection 100 mL (100 mL IntraVENous Given 3/15/23 0214)   sodium chloride 0.9 % bolus infusion 1,000 mL (0 mL IntraVENous IV Completed 3/15/23 0334)   ketorolac (TORADOL) injection 15 mg (15 mg IntraVENous Given 3/15/23 0047)   ondansetron (ZOFRAN) injection 4 mg (4 mg IntraVENous Given 3/15/23 0047)          Medical Decision Making  Patient presents today with chief complaint of abdominal pain. On initial exam, patient does have tenderness with guarding to palpation in the right lower quadrant quadrant but on repeat second abdominal assessment, exam is non-peritoneal, no guarding or rigidity. Patient did endorse small flecks of blood in her vomit, chest x-ray was obtained to rule out significant esophageal tear/rupture-this was unremarkable. Considered differentials including pyelonephritis, UA shows no evidence of UTI. Considered nephrolithiasis, small bowel obstruction, acute appendicitis, diverticulitis, cholecystitis, aortic dissection, pancreatitis, so CT abdomen and pelvis performed and shows   Impression:     Small pelvic free fluid with crenated right ovarian cyst may reflect   recent cyst rupture. No acute findings. Blood work is reassuring, normal kidney function, normal liver enzymes. Presentation not consistent with acute, emergent causes of abdominal pain at this time. Patient is tolerating PO fluids and well-appearing. Will discharge with Zofran and NSAIDs and OB/GYN follow-up. Given strict return precautions. Amount and/or Complexity of Data Reviewed  Labs: ordered. Radiology: ordered. ECG/medicine tests: ordered. Decision-making details documented in ED Course. Risk  Prescription drug management. Presentation, management, and disposition were discussed with the attending physician, Dr. Brian Hinson, who is in agreement with plan of care.         ED Course as of 03/15/23 0352   Tue Mar 14, 2023   2224 EKG, 12 LEAD, INITIAL  10:24 PM--EKG, interpreted independently by me. Rate: 104  Rhythm: Sinus tachycardia  Axis: Normal  Ischemia: No acute ST segment or T wave abnormalities to suggest acute ischemia. [AL]      ED Course User Index  [AL] Carin Chung DO       Discussed results and work-up with patient and answered all questions, the patient expresses understanding and agrees with the care plan and disposition. The patient was given an opportunity to ask questions and all concerns raised were addressed prior to discharge. Recommended patient follow-up with provider as listed below. Counseled patient on standard home and self-care measures. Specifically explained the emergent conditions that could arise and clearly instructed the patient to return to the emergency department for those and any other new, worsening, or concerning symptoms. Patient stable and ready for discharge. IMPRESSION:  1. Right lower quadrant abdominal tenderness without rebound tenderness    2. Rupture of ovarian cyst        DISPOSITION:  Discharge    PLAN:  Follow-up Information       Follow up With Specialties Details Why Contact Info    Amos Reina MD Infectious Disease Physician Schedule an appointment as soon as possible for a visit   [facility] [address] [id number]  [address] [facility]  Schedule an appointment as soon as possible for a visit   [address] [facility] [address] [facility] [address]    [address] [facility] [address] [facility] Emergency Medicine  As needed, If symptoms worsen [address] [facility]  [phone]          Discharge Medication List as of 3/15/2023  3:28 AM        START taking these medications    Details   ondansetron (ZOFRAN ODT) 4 mg disintegrating tablet Take 1 Tablet by mouth every eight (8) hours as needed for Nausea or Vomiting for up to 5 days. , Normal, Disp-10 Tablet, R-0      ibuprofen (MOTRIN) 800 mg tablet Take 1 Tablet by mouth every six (6) hours as needed for Pain for up to 7 days. , Normal, Disp-20 Tablet, R-0           CONTINUE these medications which have NOT CHANGED    Details   labetaloL (NORMODYNE) 200 mg tablet Take 1 Tablet by mouth every twelve (12) hours. Indications: high blood pressure, pre-existing hypertension during pregnancy, Normal, Disp-60 Tablet, R-1             Please note that this dictation was completed with [employer], the computer voice recognition software. Quite often unanticipated grammatical, syntax, homophones, and other interpretive errors are inadvertently transcribed by the computer software. Please disregard these errors. Please excuse any errors that have escaped final proofreading.

## 2023-03-15 NOTE — DISCHARGE INSTRUCTIONS
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

## 2023-05-14 PROCEDURE — 99284 EMERGENCY DEPT VISIT MOD MDM: CPT

## 2023-05-14 PROCEDURE — 96374 THER/PROPH/DIAG INJ IV PUSH: CPT

## 2023-05-14 PROCEDURE — 96361 HYDRATE IV INFUSION ADD-ON: CPT

## 2023-05-14 RX ORDER — ONDANSETRON 2 MG/ML
4 INJECTION INTRAMUSCULAR; INTRAVENOUS
Status: COMPLETED | OUTPATIENT
Start: 2023-05-14 | End: 2023-05-15

## 2023-05-14 RX ORDER — DEXAMETHASONE SODIUM PHOSPHATE 10 MG/ML
10 INJECTION, SOLUTION INTRAMUSCULAR; INTRAVENOUS
OUTPATIENT
Start: 2023-05-14 | End: 2023-05-15

## 2023-05-14 RX ORDER — 0.9 % SODIUM CHLORIDE 0.9 %
1000 INTRAVENOUS SOLUTION INTRAVENOUS ONCE
Status: COMPLETED | OUTPATIENT
Start: 2023-05-14 | End: 2023-05-15

## 2023-05-15 ENCOUNTER — HOSPITAL ENCOUNTER (EMERGENCY)
Facility: HOSPITAL | Age: 23
Discharge: HOME | End: 2023-05-15
Attending: EMERGENCY MEDICINE
Payer: MEDICAID

## 2023-05-15 VITALS
RESPIRATION RATE: 20 BRPM | SYSTOLIC BLOOD PRESSURE: 135 MMHG | OXYGEN SATURATION: 100 % | TEMPERATURE: 98.2 F | DIASTOLIC BLOOD PRESSURE: 88 MMHG | HEART RATE: 92 BPM

## 2023-05-15 DIAGNOSIS — J02.9 ACUTE PHARYNGITIS, UNSPECIFIED ETIOLOGY: Primary | ICD-10-CM

## 2023-05-15 LAB
COMMENT:: NORMAL
S PYO AG THROAT QL: NEGATIVE
SPECIMEN HOLD: NORMAL

## 2023-05-15 PROCEDURE — 87880 STREP A ASSAY W/OPTIC: CPT

## 2023-05-15 PROCEDURE — 87070 CULTURE OTHR SPECIMN AEROBIC: CPT

## 2023-05-15 PROCEDURE — 2580000003 HC RX 258: Performed by: FAMILY MEDICINE

## 2023-05-15 PROCEDURE — 6360000002 HC RX W HCPCS: Performed by: FAMILY MEDICINE

## 2023-05-15 RX ORDER — DEXAMETHASONE SODIUM PHOSPHATE 10 MG/ML
10 INJECTION, SOLUTION INTRAMUSCULAR; INTRAVENOUS
Status: COMPLETED | OUTPATIENT
Start: 2023-05-15 | End: 2023-05-15

## 2023-05-15 RX ORDER — GLYCERIN 0.25 %
1 DROPS OPHTHALMIC (EYE) PRN
Qty: 30 ML | Refills: 0 | Status: SHIPPED | OUTPATIENT
Start: 2023-05-15 | End: 2023-05-22

## 2023-05-15 RX ORDER — IBUPROFEN 800 MG/1
800 TABLET ORAL EVERY 6 HOURS PRN
Qty: 28 TABLET | Refills: 0 | Status: SHIPPED | OUTPATIENT
Start: 2023-05-15 | End: 2023-05-22

## 2023-05-15 RX ADMIN — DEXAMETHASONE SODIUM PHOSPHATE 10 MG: 10 INJECTION INTRAMUSCULAR; INTRAVENOUS at 01:33

## 2023-05-15 RX ADMIN — ONDANSETRON 4 MG: 2 INJECTION INTRAMUSCULAR; INTRAVENOUS at 01:02

## 2023-05-15 RX ADMIN — SODIUM CHLORIDE 1000 ML: 9 INJECTION, SOLUTION INTRAVENOUS at 01:02

## 2023-05-15 ASSESSMENT — ENCOUNTER SYMPTOMS
BACK PAIN: 0
COUGH: 1
TROUBLE SWALLOWING: 0
NAUSEA: 0
SORE THROAT: 1
ABDOMINAL PAIN: 0
SHORTNESS OF BREATH: 0
VOMITING: 0

## 2023-05-15 NOTE — ED PROVIDER NOTES
[facility] EMERGENCY DEP  EMERGENCY DEPARTMENT ENCOUNTER      Pt Name: Niurka Pineda  MRN: [id number]  V[place]olas 2000  Date of evaluation: 5/14/2023  Provider: DUARTE Ledesma NP    CHIEF COMPLAINT       Chief Complaint   Patient presents with    Pharyngitis         HISTORY OF PRESENT ILLNESS   (Location/Symptom, Timing/Onset, Context/Setting, Quality, Duration, Modifying Factors, Severity)  Note limiting factors. Patient is a 67-year-old female with no known past medical history presenting for evaluation of cough, sore throat. Symptom onset 1 to 2 days ago. Additionally reports that she has had a low appetite and it is painful to swallow, so she has not been eating and drinking. No associated fevers, nausea, vomiting. She is not taking any over-the-counter or prescribed medication for her symptoms. No other complaints. The history is provided by the patient. The history is limited by a language barrier. A  was used. Review of External Medical Records:     Nursing Notes were reviewed. REVIEW OF SYSTEMS    (2-9 systems for level 4, 10 or more for level 5)     Review of Systems   Constitutional:  Negative for unexpected weight change. HENT:  Positive for sore throat. Negative for congestion, tinnitus and trouble swallowing. Eyes:  Negative for visual disturbance. Respiratory:  Positive for cough. Negative for shortness of breath. Cardiovascular:  Negative for chest pain and palpitations. Gastrointestinal:  Negative for abdominal pain, nausea and vomiting. Endocrine: Negative for polyuria. Genitourinary:  Negative for dysuria and flank pain. Musculoskeletal:  Negative for back pain. Skin:  Negative for pallor. Allergic/Immunologic: Negative for immunocompromised state. Neurological:  Negative for dizziness and headaches. Hematological:  Negative for adenopathy. Psychiatric/Behavioral:  Negative for agitation.       Except as noted above

## 2023-05-15 NOTE — ED NOTES
Pt called for lines/labs; no show. Checked waiting room and outside and did not see pt.      Aron Mills RN  05/15/23 7829

## 2023-05-15 NOTE — ED TRIAGE NOTES
She arrives ambulatory from home for several days of sore throat, cough and feeling unwell. A&OX4 in no distress. Her voice does sound hoarse in triage.

## 2023-05-15 NOTE — DISCHARGE INSTRUCTIONS
Thank you for allowing us to provide you with medical care today. We realize that you have many choices for your emergency care needs. We thank you for choosing [facility]. Please choose us in the future for any continued health care needs. The exam and treatment you received in the emergency department were for an emergent problem and are not intended as complete care. It is important that you follow-up with a doctor. If your symptoms worsen or you do not improve you should return to the emergency department. We are available 24 hours a day. Please make an appointment with your health care provider for follow-up of your emergency department visit. Take this sheet with you when you go to your follow-up visit.

## 2023-05-15 NOTE — ED NOTES
Pt discharged with paperwork. Pt had no questions about discharge plan and was alert & oriented in no distress. Discharge instructions given to pt by provider with [place]  used.      Aron Mills RN  05/15/23 6679

## 2023-05-16 LAB
BACTERIA SPEC CULT: NORMAL
SERVICE CMNT-IMP: NORMAL

## 2024-02-25 ENCOUNTER — HOSPITAL ENCOUNTER (EMERGENCY)
Facility: HOSPITAL | Age: 24
Discharge: HOME | End: 2024-02-25
Attending: EMERGENCY MEDICINE

## 2024-02-25 ENCOUNTER — APPOINTMENT (OUTPATIENT)
Facility: HOSPITAL | Age: 24
End: 2024-02-25

## 2024-02-25 VITALS
BODY MASS INDEX: 24.49 KG/M2 | TEMPERATURE: 98.6 F | RESPIRATION RATE: 18 BRPM | SYSTOLIC BLOOD PRESSURE: 120 MMHG | DIASTOLIC BLOOD PRESSURE: 87 MMHG | HEART RATE: 114 BPM | HEIGHT: 63 IN | OXYGEN SATURATION: 100 % | WEIGHT: 138.23 LBS

## 2024-02-25 DIAGNOSIS — R05.9 COUGH, UNSPECIFIED TYPE: ICD-10-CM

## 2024-02-25 DIAGNOSIS — J02.9 ACUTE PHARYNGITIS, UNSPECIFIED ETIOLOGY: Primary | ICD-10-CM

## 2024-02-25 DIAGNOSIS — B34.9 VIRAL ILLNESS: ICD-10-CM

## 2024-02-25 LAB
EKG ATRIAL RATE: 107 BPM
EKG DIAGNOSIS: NORMAL
EKG P AXIS: 60 DEGREES
EKG P-R INTERVAL: 124 MS
EKG Q-T INTERVAL: 318 MS
EKG QRS DURATION: 72 MS
EKG QTC CALCULATION (BAZETT): 424 MS
EKG R AXIS: 70 DEGREES
EKG T AXIS: 44 DEGREES
EKG VENTRICULAR RATE: 107 BPM
FLUAV AG NPH QL IA: POSITIVE
FLUBV AG NOSE QL IA: NEGATIVE
SARS-COV-2 RDRP RESP QL NAA+PROBE: NOT DETECTED
SOURCE: NORMAL

## 2024-02-25 PROCEDURE — 71046 X-RAY EXAM CHEST 2 VIEWS: CPT

## 2024-02-25 PROCEDURE — 96372 THER/PROPH/DIAG INJ SC/IM: CPT

## 2024-02-25 PROCEDURE — 87635 SARS-COV-2 COVID-19 AMP PRB: CPT

## 2024-02-25 PROCEDURE — 87804 INFLUENZA ASSAY W/OPTIC: CPT

## 2024-02-25 PROCEDURE — 93005 ELECTROCARDIOGRAM TRACING: CPT | Performed by: EMERGENCY MEDICINE

## 2024-02-25 PROCEDURE — 93010 ELECTROCARDIOGRAM REPORT: CPT | Performed by: SPECIALIST

## 2024-02-25 PROCEDURE — 6360000002 HC RX W HCPCS: Performed by: EMERGENCY MEDICINE

## 2024-02-25 PROCEDURE — 99284 EMERGENCY DEPT VISIT MOD MDM: CPT

## 2024-02-25 PROCEDURE — 6370000000 HC RX 637 (ALT 250 FOR IP): Performed by: EMERGENCY MEDICINE

## 2024-02-25 RX ORDER — ACETAMINOPHEN 500 MG
500 TABLET ORAL EVERY 4 HOURS PRN
Qty: 15 TABLET | Refills: 0 | Status: SHIPPED | OUTPATIENT
Start: 2024-02-25

## 2024-02-25 RX ORDER — ACETAMINOPHEN 325 MG/1
650 TABLET ORAL
Status: COMPLETED | OUTPATIENT
Start: 2024-02-25 | End: 2024-02-25

## 2024-02-25 RX ORDER — AMOXICILLIN 500 MG/1
500 CAPSULE ORAL 2 TIMES DAILY
Qty: 20 CAPSULE | Refills: 0 | Status: SHIPPED | OUTPATIENT
Start: 2024-02-25 | End: 2024-03-06

## 2024-02-25 RX ORDER — KETOROLAC TROMETHAMINE 30 MG/ML
30 INJECTION, SOLUTION INTRAMUSCULAR; INTRAVENOUS ONCE
Status: COMPLETED | OUTPATIENT
Start: 2024-02-25 | End: 2024-02-25

## 2024-02-25 RX ORDER — KETOROLAC TROMETHAMINE 10 MG/1
10 TABLET, FILM COATED ORAL EVERY 6 HOURS PRN
Qty: 10 TABLET | Refills: 0 | Status: SHIPPED | OUTPATIENT
Start: 2024-02-25

## 2024-02-25 RX ORDER — ALBUTEROL SULFATE 90 UG/1
2 AEROSOL, METERED RESPIRATORY (INHALATION) 4 TIMES DAILY PRN
Qty: 18 G | Refills: 0 | Status: SHIPPED | OUTPATIENT
Start: 2024-02-25

## 2024-02-25 RX ADMIN — ACETAMINOPHEN 650 MG: 325 TABLET ORAL at 05:06

## 2024-02-25 RX ADMIN — KETOROLAC TROMETHAMINE 30 MG: 30 INJECTION, SOLUTION INTRAMUSCULAR; INTRAVENOUS at 05:05

## 2024-02-25 ASSESSMENT — PAIN DESCRIPTION - LOCATION: LOCATION: ABDOMEN;THROAT

## 2024-02-25 ASSESSMENT — PAIN SCALES - GENERAL: PAINLEVEL_OUTOF10: 6

## 2024-02-25 ASSESSMENT — PAIN DESCRIPTION - DESCRIPTORS: DESCRIPTORS: SHARP

## 2024-02-25 ASSESSMENT — PAIN - FUNCTIONAL ASSESSMENT: PAIN_FUNCTIONAL_ASSESSMENT: 0-10

## 2024-02-25 NOTE — ED PROVIDER NOTES
CONSULTS:  None    PROCEDURES:  Unless otherwise noted below, none     Procedures      FINAL IMPRESSION      1. Acute pharyngitis, unspecified etiology    2. Cough, unspecified type          DISPOSITION/PLAN   DISPOSITION Decision To Discharge 02/25/2024 05:08:25 AM      PATIENT REFERRED TO:  Ricco Culver MD  [address] [facility] [address] [phone]    Schedule an appointment as soon as possible for a visit         DISCHARGE MEDICATIONS:  Discharge Medication List as of 2/25/2024  5:00 AM        START taking these medications    Details   albuterol sulfate HFA (VENTOLIN HFA) 108 (90 Base) MCG/ACT inhaler Inhale 2 puffs into the lungs 4 times daily as needed for Wheezing, Disp-18 g, R-0Print      amoxicillin (AMOXIL) 500 MG capsule Take 1 capsule by mouth 2 times daily for 10 days, Disp-20 capsule, R-0Print      acetaminophen (TYLENOL) 500 MG tablet Take 1 tablet by mouth every 4 hours as needed for Pain or Fever, Disp-15 tablet, R-0Print      ketorolac (TORADOL) 10 MG tablet Take 1 tablet by mouth every 6 hours as needed for Pain, Disp-10 tablet, R-0Print               (Please note that portions of this note were completed with a voice recognition program.  Efforts were made to edit the dictations but occasionally words are mis-transcribed.)    Boone Carver MD (electronically signed)  Emergency Medicine Attending Physician             Boone Carver MD  02/29/24 0937

## 2024-02-25 NOTE — ED TRIAGE NOTES
Requires  - German     Patient arrives ambulatory with a chief complaint of URI sxs. And abd pain     Patient reports fever, cough -- chest pain related to coughing, sore throat. Symptoms began 3-4 days ago but fever started yesterday. Patient was not around any with COVID or flu that they are aware of. Patient took tylenol for the fever (last 2145) . Some SOB     Chest pain and left/right abd. 6/10 (pain) : sharp     Denies: N/V/D/urinary pain

## 2024-02-29 ASSESSMENT — ENCOUNTER SYMPTOMS
SORE THROAT: 1
COUGH: 1

## 2024-10-09 ENCOUNTER — APPOINTMENT (OUTPATIENT)
Facility: HOSPITAL | Age: 24
End: 2024-10-09

## 2024-10-09 ENCOUNTER — HOSPITAL ENCOUNTER (EMERGENCY)
Facility: HOSPITAL | Age: 24
Discharge: HOME | End: 2024-10-09
Attending: STUDENT IN AN ORGANIZED HEALTH CARE EDUCATION/TRAINING PROGRAM

## 2024-10-09 VITALS
RESPIRATION RATE: 14 BRPM | HEART RATE: 70 BPM | WEIGHT: 151.46 LBS | OXYGEN SATURATION: 100 % | TEMPERATURE: 98.1 F | HEIGHT: 63 IN | SYSTOLIC BLOOD PRESSURE: 116 MMHG | DIASTOLIC BLOOD PRESSURE: 84 MMHG | BODY MASS INDEX: 26.84 KG/M2

## 2024-10-09 DIAGNOSIS — R10.9 ABDOMINAL PAIN, UNSPECIFIED ABDOMINAL LOCATION: Primary | ICD-10-CM

## 2024-10-09 LAB
ALBUMIN SERPL-MCNC: 4.1 G/DL (ref 3.5–5)
ALBUMIN/GLOB SERPL: 0.8 (ref 1.1–2.2)
ALP SERPL-CCNC: 72 U/L (ref 45–117)
ALT SERPL-CCNC: 28 U/L (ref 12–78)
ANION GAP SERPL CALC-SCNC: 4 MMOL/L (ref 2–12)
APPEARANCE UR: CLEAR
AST SERPL-CCNC: 18 U/L (ref 15–37)
BACTERIA URNS QL MICRO: ABNORMAL /HPF
BASOPHILS # BLD: 0 K/UL (ref 0–0.1)
BASOPHILS NFR BLD: 0 % (ref 0–1)
BILIRUB SERPL-MCNC: 0.4 MG/DL (ref 0.2–1)
BILIRUB UR QL: NEGATIVE
BUN SERPL-MCNC: 7 MG/DL (ref 6–20)
BUN/CREAT SERPL: 8 (ref 12–20)
CALCIUM SERPL-MCNC: 9.6 MG/DL (ref 8.5–10.1)
CHLORIDE SERPL-SCNC: 106 MMOL/L (ref 97–108)
CO2 SERPL-SCNC: 26 MMOL/L (ref 21–32)
COLOR UR: ABNORMAL
COMMENT:: NORMAL
CREAT SERPL-MCNC: 0.87 MG/DL (ref 0.55–1.02)
DIFFERENTIAL METHOD BLD: NORMAL
EOSINOPHIL # BLD: 0.1 K/UL (ref 0–0.4)
EOSINOPHIL NFR BLD: 1 % (ref 0–7)
EPITH CASTS URNS QL MICRO: ABNORMAL /LPF
ERYTHROCYTE [DISTWIDTH] IN BLOOD BY AUTOMATED COUNT: 13.3 % (ref 11.5–14.5)
GLOBULIN SER CALC-MCNC: 5.2 G/DL (ref 2–4)
GLUCOSE SERPL-MCNC: 97 MG/DL (ref 65–100)
GLUCOSE UR STRIP.AUTO-MCNC: NEGATIVE MG/DL
HCG, URINE, POC: NEGATIVE
HCT VFR BLD AUTO: 38.9 % (ref 35–47)
HGB BLD-MCNC: 13.4 G/DL (ref 11.5–16)
HGB UR QL STRIP: NEGATIVE
HYALINE CASTS URNS QL MICRO: ABNORMAL /LPF (ref 0–5)
IMM GRANULOCYTES # BLD AUTO: 0 K/UL (ref 0–0.04)
IMM GRANULOCYTES NFR BLD AUTO: 0 % (ref 0–0.5)
KETONES UR QL STRIP.AUTO: NEGATIVE MG/DL
LEUKOCYTE ESTERASE UR QL STRIP.AUTO: NEGATIVE
LYMPHOCYTES # BLD: 1.7 K/UL (ref 0.8–3.5)
LYMPHOCYTES NFR BLD: 20 % (ref 12–49)
Lab: NORMAL
MCH RBC QN AUTO: 30.5 PG (ref 26–34)
MCHC RBC AUTO-ENTMCNC: 34.4 G/DL (ref 30–36.5)
MCV RBC AUTO: 88.4 FL (ref 80–99)
MONOCYTES # BLD: 0.5 K/UL (ref 0–1)
MONOCYTES NFR BLD: 5 % (ref 5–13)
MUCOUS THREADS URNS QL MICRO: ABNORMAL /LPF
NEGATIVE QC PASS/FAIL: NORMAL
NEUTS SEG # BLD: 6 K/UL (ref 1.8–8)
NEUTS SEG NFR BLD: 74 % (ref 32–75)
NITRITE UR QL STRIP.AUTO: POSITIVE
NRBC # BLD: 0 K/UL (ref 0–0.01)
NRBC BLD-RTO: 0 PER 100 WBC
PH UR STRIP: 6 (ref 5–8)
PLATELET # BLD AUTO: 232 K/UL (ref 150–400)
PMV BLD AUTO: 9.9 FL (ref 8.9–12.9)
POSITIVE QC PASS/FAIL: NORMAL
POTASSIUM SERPL-SCNC: 3.2 MMOL/L (ref 3.5–5.1)
PROT SERPL-MCNC: 9.3 G/DL (ref 6.4–8.2)
PROT UR STRIP-MCNC: NEGATIVE MG/DL
RBC # BLD AUTO: 4.4 M/UL (ref 3.8–5.2)
RBC #/AREA URNS HPF: ABNORMAL /HPF (ref 0–5)
SODIUM SERPL-SCNC: 136 MMOL/L (ref 136–145)
SP GR UR REFRACTOMETRY: 1.01 (ref 1–1.03)
SPECIMEN HOLD: NORMAL
URINE CULTURE IF INDICATED: ABNORMAL
UROBILINOGEN UR QL STRIP.AUTO: 0.2 EU/DL (ref 0.2–1)
WBC # BLD AUTO: 8.3 K/UL (ref 3.6–11)
WBC URNS QL MICRO: ABNORMAL /HPF (ref 0–4)

## 2024-10-09 PROCEDURE — 6360000004 HC RX CONTRAST MEDICATION: Performed by: STUDENT IN AN ORGANIZED HEALTH CARE EDUCATION/TRAINING PROGRAM

## 2024-10-09 PROCEDURE — 81001 URINALYSIS AUTO W/SCOPE: CPT

## 2024-10-09 PROCEDURE — 96374 THER/PROPH/DIAG INJ IV PUSH: CPT

## 2024-10-09 PROCEDURE — 2580000003 HC RX 258: Performed by: NURSE PRACTITIONER

## 2024-10-09 PROCEDURE — 96372 THER/PROPH/DIAG INJ SC/IM: CPT

## 2024-10-09 PROCEDURE — 6360000002 HC RX W HCPCS: Performed by: NURSE PRACTITIONER

## 2024-10-09 PROCEDURE — 99285 EMERGENCY DEPT VISIT HI MDM: CPT

## 2024-10-09 PROCEDURE — 74177 CT ABD & PELVIS W/CONTRAST: CPT

## 2024-10-09 PROCEDURE — 85025 COMPLETE CBC W/AUTO DIFF WBC: CPT

## 2024-10-09 PROCEDURE — 80053 COMPREHEN METABOLIC PANEL: CPT

## 2024-10-09 PROCEDURE — 96375 TX/PRO/DX INJ NEW DRUG ADDON: CPT

## 2024-10-09 RX ORDER — DICYCLOMINE HYDROCHLORIDE 10 MG/ML
20 INJECTION INTRAMUSCULAR ONCE
Status: COMPLETED | OUTPATIENT
Start: 2024-10-09 | End: 2024-10-09

## 2024-10-09 RX ORDER — 0.9 % SODIUM CHLORIDE 0.9 %
1000 INTRAVENOUS SOLUTION INTRAVENOUS ONCE
Status: COMPLETED | OUTPATIENT
Start: 2024-10-09 | End: 2024-10-09

## 2024-10-09 RX ORDER — KETOROLAC TROMETHAMINE 30 MG/ML
30 INJECTION, SOLUTION INTRAMUSCULAR; INTRAVENOUS
Status: COMPLETED | OUTPATIENT
Start: 2024-10-09 | End: 2024-10-09

## 2024-10-09 RX ORDER — IOPAMIDOL 755 MG/ML
100 INJECTION, SOLUTION INTRAVASCULAR
Status: COMPLETED | OUTPATIENT
Start: 2024-10-09 | End: 2024-10-09

## 2024-10-09 RX ORDER — ONDANSETRON 2 MG/ML
4 INJECTION INTRAMUSCULAR; INTRAVENOUS ONCE
Status: COMPLETED | OUTPATIENT
Start: 2024-10-09 | End: 2024-10-09

## 2024-10-09 RX ADMIN — KETOROLAC TROMETHAMINE 30 MG: 30 INJECTION, SOLUTION INTRAMUSCULAR at 01:03

## 2024-10-09 RX ADMIN — ONDANSETRON 4 MG: 2 INJECTION INTRAMUSCULAR; INTRAVENOUS at 01:02

## 2024-10-09 RX ADMIN — IOPAMIDOL 100 ML: 755 INJECTION, SOLUTION INTRAVENOUS at 01:21

## 2024-10-09 RX ADMIN — SODIUM CHLORIDE 1000 ML: 9 INJECTION, SOLUTION INTRAVENOUS at 01:05

## 2024-10-09 RX ADMIN — DICYCLOMINE HYDROCHLORIDE 20 MG: 10 INJECTION, SOLUTION INTRAMUSCULAR at 01:54

## 2024-10-09 ASSESSMENT — PAIN DESCRIPTION - ORIENTATION
ORIENTATION: RIGHT;LEFT
ORIENTATION: RIGHT;LEFT
ORIENTATION: LEFT;LOWER

## 2024-10-09 ASSESSMENT — PAIN SCALES - GENERAL
PAINLEVEL_OUTOF10: 6
PAINLEVEL_OUTOF10: 10

## 2024-10-09 ASSESSMENT — PAIN DESCRIPTION - DESCRIPTORS
DESCRIPTORS: ACHING;DISCOMFORT
DESCRIPTORS: SHARP
DESCRIPTORS: DISCOMFORT;SHOOTING

## 2024-10-09 ASSESSMENT — PAIN DESCRIPTION - FREQUENCY: FREQUENCY: CONTINUOUS

## 2024-10-09 ASSESSMENT — ENCOUNTER SYMPTOMS
NAUSEA: 1
ABDOMINAL PAIN: 1

## 2024-10-09 ASSESSMENT — PAIN DESCRIPTION - LOCATION
LOCATION: ABDOMEN
LOCATION: ABDOMEN

## 2024-10-09 ASSESSMENT — PAIN - FUNCTIONAL ASSESSMENT
PAIN_FUNCTIONAL_ASSESSMENT: 0-10
PAIN_FUNCTIONAL_ASSESSMENT: ACTIVITIES ARE NOT PREVENTED

## 2024-10-09 ASSESSMENT — PAIN DESCRIPTION - ONSET: ONSET: PROGRESSIVE

## 2024-10-09 NOTE — CONSULTS
Session ID: [id number]  Language: [facility]   ID:   Language: [facility]   ID: [id number]

## 2024-10-09 NOTE — ED TRIAGE NOTES
Patient arrives ambulatory with c/o LLQ abdominal pain x2 days and headache. Denies N/V/D or urinary symptoms. Last BM today.    Yi  used during triage.

## 2024-11-05 ENCOUNTER — APPOINTMENT (OUTPATIENT)
Facility: HOSPITAL | Age: 24
DRG: 871 | End: 2024-11-05

## 2024-11-05 ENCOUNTER — HOSPITAL ENCOUNTER (INPATIENT)
Facility: HOSPITAL | Age: 24
LOS: 1 days | Discharge: HOME | DRG: 871 | End: 2024-11-07
Attending: STUDENT IN AN ORGANIZED HEALTH CARE EDUCATION/TRAINING PROGRAM | Admitting: FAMILY MEDICINE

## 2024-11-05 DIAGNOSIS — R00.0 SINUS TACHYCARDIA: ICD-10-CM

## 2024-11-05 DIAGNOSIS — R65.10 SIRS (SYSTEMIC INFLAMMATORY RESPONSE SYNDROME) (HCC): Primary | ICD-10-CM

## 2024-11-05 DIAGNOSIS — J15.7 PNEUMONIA DUE TO MYCOPLASMA PNEUMONIAE, UNSPECIFIED LATERALITY, UNSPECIFIED PART OF LUNG: ICD-10-CM

## 2024-11-05 LAB
ALBUMIN SERPL-MCNC: 4.2 G/DL (ref 3.5–5)
ALBUMIN/GLOB SERPL: 0.8 (ref 1.1–2.2)
ALP SERPL-CCNC: 70 U/L (ref 45–117)
ALT SERPL-CCNC: 46 U/L (ref 12–78)
ANION GAP SERPL CALC-SCNC: 8 MMOL/L (ref 2–12)
APPEARANCE UR: CLEAR
AST SERPL-CCNC: 45 U/L (ref 15–37)
BACTERIA URNS QL MICRO: ABNORMAL /HPF
BASOPHILS # BLD: 0 K/UL (ref 0–0.1)
BASOPHILS NFR BLD: 0 % (ref 0–1)
BILIRUB SERPL-MCNC: 0.7 MG/DL (ref 0.2–1)
BILIRUB UR QL: NEGATIVE
BUN SERPL-MCNC: 6 MG/DL (ref 6–20)
BUN/CREAT SERPL: 7 (ref 12–20)
CALCIUM SERPL-MCNC: 9.6 MG/DL (ref 8.5–10.1)
CHLORIDE SERPL-SCNC: 101 MMOL/L (ref 97–108)
CO2 SERPL-SCNC: 26 MMOL/L (ref 21–32)
COLOR UR: ABNORMAL
CREAT SERPL-MCNC: 0.81 MG/DL (ref 0.55–1.02)
D DIMER PPP FEU-MCNC: 0.45 MG/L FEU (ref 0–0.65)
DIFFERENTIAL METHOD BLD: ABNORMAL
EOSINOPHIL # BLD: 0.2 K/UL (ref 0–0.4)
EOSINOPHIL NFR BLD: 2 % (ref 0–7)
EPITH CASTS URNS QL MICRO: ABNORMAL /LPF
ERYTHROCYTE [DISTWIDTH] IN BLOOD BY AUTOMATED COUNT: 13.6 % (ref 11.5–14.5)
FLUAV RNA SPEC QL NAA+PROBE: NOT DETECTED
FLUBV RNA SPEC QL NAA+PROBE: NOT DETECTED
GLOBULIN SER CALC-MCNC: 5.5 G/DL (ref 2–4)
GLUCOSE SERPL-MCNC: 98 MG/DL (ref 65–100)
GLUCOSE UR STRIP.AUTO-MCNC: NEGATIVE MG/DL
HCG UR QL: NEGATIVE
HCT VFR BLD AUTO: 39 % (ref 35–47)
HGB BLD-MCNC: 13.1 G/DL (ref 11.5–16)
HGB UR QL STRIP: NEGATIVE
IMM GRANULOCYTES # BLD AUTO: 0.1 K/UL (ref 0–0.04)
IMM GRANULOCYTES NFR BLD AUTO: 1 % (ref 0–0.5)
KETONES UR QL STRIP.AUTO: NEGATIVE MG/DL
LACTATE SERPL-SCNC: 1.1 MMOL/L (ref 0.4–2)
LEUKOCYTE ESTERASE UR QL STRIP.AUTO: NEGATIVE
LYMPHOCYTES # BLD: 0.7 K/UL (ref 0.8–3.5)
LYMPHOCYTES NFR BLD: 9 % (ref 12–49)
MCH RBC QN AUTO: 30 PG (ref 26–34)
MCHC RBC AUTO-ENTMCNC: 33.6 G/DL (ref 30–36.5)
MCV RBC AUTO: 89.2 FL (ref 80–99)
MONOCYTES # BLD: 0.3 K/UL (ref 0–1)
MONOCYTES NFR BLD: 4 % (ref 5–13)
NEUTS SEG # BLD: 6.6 K/UL (ref 1.8–8)
NEUTS SEG NFR BLD: 84 % (ref 32–75)
NITRITE UR QL STRIP.AUTO: NEGATIVE
NRBC # BLD: 0 K/UL (ref 0–0.01)
NRBC BLD-RTO: 0 PER 100 WBC
PH UR STRIP: 7 (ref 5–8)
PLATELET # BLD AUTO: 175 K/UL (ref 150–400)
PMV BLD AUTO: 9.6 FL (ref 8.9–12.9)
POTASSIUM SERPL-SCNC: 3.4 MMOL/L (ref 3.5–5.1)
PROT SERPL-MCNC: 9.7 G/DL (ref 6.4–8.2)
PROT UR STRIP-MCNC: NEGATIVE MG/DL
RBC # BLD AUTO: 4.37 M/UL (ref 3.8–5.2)
RBC #/AREA URNS HPF: ABNORMAL /HPF (ref 0–5)
RBC MORPH BLD: ABNORMAL
S PYO DNA THROAT QL NAA+PROBE: NOT DETECTED
SARS-COV-2 RNA RESP QL NAA+PROBE: NOT DETECTED
SODIUM SERPL-SCNC: 135 MMOL/L (ref 136–145)
SOURCE: NORMAL
SP GR UR REFRACTOMETRY: 1.02 (ref 1–1.03)
SPECIMEN HOLD: NORMAL
TROPONIN I SERPL HS-MCNC: <4 NG/L (ref 0–51)
UROBILINOGEN UR QL STRIP.AUTO: 0.2 EU/DL (ref 0.2–1)
WBC # BLD AUTO: 7.9 K/UL (ref 3.6–11)
WBC URNS QL MICRO: ABNORMAL /HPF (ref 0–4)

## 2024-11-05 PROCEDURE — 86308 HETEROPHILE ANTIBODY SCREEN: CPT

## 2024-11-05 PROCEDURE — 81025 URINE PREGNANCY TEST: CPT

## 2024-11-05 PROCEDURE — 87636 SARSCOV2 & INF A&B AMP PRB: CPT

## 2024-11-05 PROCEDURE — 87040 BLOOD CULTURE FOR BACTERIA: CPT

## 2024-11-05 PROCEDURE — 2580000003 HC RX 258: Performed by: EMERGENCY MEDICINE

## 2024-11-05 PROCEDURE — 87651 STREP A DNA AMP PROBE: CPT

## 2024-11-05 PROCEDURE — 71260 CT THORAX DX C+: CPT

## 2024-11-05 PROCEDURE — 36415 COLL VENOUS BLD VENIPUNCTURE: CPT

## 2024-11-05 PROCEDURE — 71046 X-RAY EXAM CHEST 2 VIEWS: CPT

## 2024-11-05 PROCEDURE — 83605 ASSAY OF LACTIC ACID: CPT

## 2024-11-05 PROCEDURE — 93005 ELECTROCARDIOGRAM TRACING: CPT | Performed by: EMERGENCY MEDICINE

## 2024-11-05 PROCEDURE — 84484 ASSAY OF TROPONIN QUANT: CPT

## 2024-11-05 PROCEDURE — 96374 THER/PROPH/DIAG INJ IV PUSH: CPT

## 2024-11-05 PROCEDURE — 85025 COMPLETE CBC W/AUTO DIFF WBC: CPT

## 2024-11-05 PROCEDURE — 85379 FIBRIN DEGRADATION QUANT: CPT

## 2024-11-05 PROCEDURE — 6360000002 HC RX W HCPCS: Performed by: EMERGENCY MEDICINE

## 2024-11-05 PROCEDURE — 80053 COMPREHEN METABOLIC PANEL: CPT

## 2024-11-05 PROCEDURE — 99285 EMERGENCY DEPT VISIT HI MDM: CPT

## 2024-11-05 PROCEDURE — 81001 URINALYSIS AUTO W/SCOPE: CPT

## 2024-11-05 PROCEDURE — 2580000003 HC RX 258: Performed by: STUDENT IN AN ORGANIZED HEALTH CARE EDUCATION/TRAINING PROGRAM

## 2024-11-05 RX ORDER — 0.9 % SODIUM CHLORIDE 0.9 %
1000 INTRAVENOUS SOLUTION INTRAVENOUS ONCE
Status: COMPLETED | OUTPATIENT
Start: 2024-11-05 | End: 2024-11-06

## 2024-11-05 RX ORDER — IOPAMIDOL 755 MG/ML
100 INJECTION, SOLUTION INTRAVASCULAR
Status: COMPLETED | OUTPATIENT
Start: 2024-11-05 | End: 2024-11-06

## 2024-11-05 RX ORDER — 0.9 % SODIUM CHLORIDE 0.9 %
1000 INTRAVENOUS SOLUTION INTRAVENOUS ONCE
Status: COMPLETED | OUTPATIENT
Start: 2024-11-05 | End: 2024-11-05

## 2024-11-05 RX ORDER — KETOROLAC TROMETHAMINE 30 MG/ML
15 INJECTION, SOLUTION INTRAMUSCULAR; INTRAVENOUS ONCE
Status: COMPLETED | OUTPATIENT
Start: 2024-11-05 | End: 2024-11-05

## 2024-11-05 RX ADMIN — SODIUM CHLORIDE 1000 ML: 9 INJECTION, SOLUTION INTRAVENOUS at 21:31

## 2024-11-05 RX ADMIN — SODIUM CHLORIDE 1000 ML: 9 INJECTION, SOLUTION INTRAVENOUS at 23:53

## 2024-11-05 RX ADMIN — KETOROLAC TROMETHAMINE 15 MG: 30 INJECTION, SOLUTION INTRAMUSCULAR at 20:10

## 2024-11-05 ASSESSMENT — PAIN DESCRIPTION - ONSET
ONSET: ON-GOING
ONSET: PROGRESSIVE

## 2024-11-05 ASSESSMENT — PAIN DESCRIPTION - PAIN TYPE
TYPE: ACUTE PAIN
TYPE: ACUTE PAIN

## 2024-11-05 ASSESSMENT — PAIN DESCRIPTION - FREQUENCY
FREQUENCY: CONTINUOUS
FREQUENCY: CONTINUOUS

## 2024-11-05 ASSESSMENT — PAIN DESCRIPTION - LOCATION
LOCATION: HEAD
LOCATION: HEAD

## 2024-11-05 ASSESSMENT — PAIN SCALES - GENERAL
PAINLEVEL_OUTOF10: 10
PAINLEVEL_OUTOF10: 10

## 2024-11-05 ASSESSMENT — PAIN DESCRIPTION - DESCRIPTORS
DESCRIPTORS: ACHING
DESCRIPTORS: ACHING

## 2024-11-05 ASSESSMENT — PAIN - FUNCTIONAL ASSESSMENT
PAIN_FUNCTIONAL_ASSESSMENT: ACTIVITIES ARE NOT PREVENTED
PAIN_FUNCTIONAL_ASSESSMENT: 0-10
PAIN_FUNCTIONAL_ASSESSMENT: ACTIVITIES ARE NOT PREVENTED

## 2024-11-06 PROBLEM — J15.7 MYCOPLASMA PNEUMONIA: Status: ACTIVE | Noted: 2024-11-06

## 2024-11-06 LAB
AMPHET UR QL SCN: NEGATIVE
ANION GAP SERPL CALC-SCNC: 7 MMOL/L (ref 2–12)
B PERT DNA SPEC QL NAA+PROBE: NOT DETECTED
BARBITURATES UR QL SCN: NEGATIVE
BENZODIAZ UR QL: NEGATIVE
BORDETELLA PARAPERTUSSIS BY PCR: NOT DETECTED
BUN SERPL-MCNC: 5 MG/DL (ref 6–20)
BUN/CREAT SERPL: 6 (ref 12–20)
C PNEUM DNA SPEC QL NAA+PROBE: NOT DETECTED
CALCIUM SERPL-MCNC: 8.2 MG/DL (ref 8.5–10.1)
CANNABINOIDS UR QL SCN: NEGATIVE
CHLORIDE SERPL-SCNC: 105 MMOL/L (ref 97–108)
CO2 SERPL-SCNC: 24 MMOL/L (ref 21–32)
COCAINE UR QL SCN: NEGATIVE
COMMENT:: NORMAL
CREAT SERPL-MCNC: 0.9 MG/DL (ref 0.55–1.02)
EKG ATRIAL RATE: 135 BPM
EKG DIAGNOSIS: NORMAL
EKG P AXIS: 66 DEGREES
EKG P-R INTERVAL: 122 MS
EKG Q-T INTERVAL: 286 MS
EKG QRS DURATION: 66 MS
EKG QTC CALCULATION (BAZETT): 429 MS
EKG R AXIS: 74 DEGREES
EKG T AXIS: 54 DEGREES
EKG VENTRICULAR RATE: 135 BPM
FLUAV SUBTYP SPEC NAA+PROBE: NOT DETECTED
FLUBV RNA SPEC QL NAA+PROBE: NOT DETECTED
GLUCOSE SERPL-MCNC: 148 MG/DL (ref 65–100)
HADV DNA SPEC QL NAA+PROBE: NOT DETECTED
HCOV 229E RNA SPEC QL NAA+PROBE: NOT DETECTED
HCOV HKU1 RNA SPEC QL NAA+PROBE: NOT DETECTED
HCOV NL63 RNA SPEC QL NAA+PROBE: NOT DETECTED
HCOV OC43 RNA SPEC QL NAA+PROBE: NOT DETECTED
HETEROPH AB BLD QL IA: NEGATIVE
HMPV RNA SPEC QL NAA+PROBE: NOT DETECTED
HPIV1 RNA SPEC QL NAA+PROBE: NOT DETECTED
HPIV2 RNA SPEC QL NAA+PROBE: NOT DETECTED
HPIV3 RNA SPEC QL NAA+PROBE: NOT DETECTED
HPIV4 RNA SPEC QL NAA+PROBE: NOT DETECTED
Lab: NORMAL
M PNEUMO DNA SPEC QL NAA+PROBE: DETECTED
MAGNESIUM SERPL-MCNC: 2 MG/DL (ref 1.6–2.4)
METHADONE UR QL: NEGATIVE
OPIATES UR QL: NEGATIVE
PCP UR QL: NEGATIVE
POTASSIUM SERPL-SCNC: 3.4 MMOL/L (ref 3.5–5.1)
PROCALCITONIN SERPL-MCNC: 0.09 NG/ML
RSV RNA SPEC QL NAA+PROBE: NOT DETECTED
RV+EV RNA SPEC QL NAA+PROBE: NOT DETECTED
SARS-COV-2 RNA RESP QL NAA+PROBE: NOT DETECTED
SODIUM SERPL-SCNC: 136 MMOL/L (ref 136–145)
SPECIMEN HOLD: NORMAL
SPECIMEN HOLD: NORMAL

## 2024-11-06 PROCEDURE — 80307 DRUG TEST PRSMV CHEM ANLYZR: CPT

## 2024-11-06 PROCEDURE — 2580000003 HC RX 258: Performed by: STUDENT IN AN ORGANIZED HEALTH CARE EDUCATION/TRAINING PROGRAM

## 2024-11-06 PROCEDURE — 2580000003 HC RX 258: Performed by: NURSE PRACTITIONER

## 2024-11-06 PROCEDURE — 6360000002 HC RX W HCPCS: Performed by: STUDENT IN AN ORGANIZED HEALTH CARE EDUCATION/TRAINING PROGRAM

## 2024-11-06 PROCEDURE — 83735 ASSAY OF MAGNESIUM: CPT

## 2024-11-06 PROCEDURE — 96375 TX/PRO/DX INJ NEW DRUG ADDON: CPT

## 2024-11-06 PROCEDURE — 1100000003 HC PRIVATE W/ TELEMETRY

## 2024-11-06 PROCEDURE — 6370000000 HC RX 637 (ALT 250 FOR IP): Performed by: NURSE PRACTITIONER

## 2024-11-06 PROCEDURE — G0378 HOSPITAL OBSERVATION PER HR: HCPCS

## 2024-11-06 PROCEDURE — 6360000004 HC RX CONTRAST MEDICATION: Performed by: EMERGENCY MEDICINE

## 2024-11-06 PROCEDURE — 80048 BASIC METABOLIC PNL TOTAL CA: CPT

## 2024-11-06 PROCEDURE — 2500000003 HC RX 250 WO HCPCS: Performed by: STUDENT IN AN ORGANIZED HEALTH CARE EDUCATION/TRAINING PROGRAM

## 2024-11-06 PROCEDURE — 84145 PROCALCITONIN (PCT): CPT

## 2024-11-06 PROCEDURE — 94640 AIRWAY INHALATION TREATMENT: CPT

## 2024-11-06 PROCEDURE — 36415 COLL VENOUS BLD VENIPUNCTURE: CPT

## 2024-11-06 PROCEDURE — 6370000000 HC RX 637 (ALT 250 FOR IP): Performed by: STUDENT IN AN ORGANIZED HEALTH CARE EDUCATION/TRAINING PROGRAM

## 2024-11-06 PROCEDURE — 0202U NFCT DS 22 TRGT SARS-COV-2: CPT

## 2024-11-06 RX ORDER — ACETAMINOPHEN 650 MG/1
650 SUPPOSITORY RECTAL EVERY 6 HOURS PRN
Status: DISCONTINUED | OUTPATIENT
Start: 2024-11-06 | End: 2024-11-07 | Stop reason: HOSPADM

## 2024-11-06 RX ORDER — SODIUM CHLORIDE 0.9 % (FLUSH) 0.9 %
5-40 SYRINGE (ML) INJECTION EVERY 12 HOURS SCHEDULED
Status: DISCONTINUED | OUTPATIENT
Start: 2024-11-06 | End: 2024-11-07 | Stop reason: HOSPADM

## 2024-11-06 RX ORDER — ENOXAPARIN SODIUM 100 MG/ML
40 INJECTION SUBCUTANEOUS DAILY
Status: DISCONTINUED | OUTPATIENT
Start: 2024-11-06 | End: 2024-11-07 | Stop reason: HOSPADM

## 2024-11-06 RX ORDER — POTASSIUM CHLORIDE 750 MG/1
40 TABLET, EXTENDED RELEASE ORAL PRN
Status: DISCONTINUED | OUTPATIENT
Start: 2024-11-06 | End: 2024-11-06

## 2024-11-06 RX ORDER — ONDANSETRON 4 MG/1
4 TABLET, ORALLY DISINTEGRATING ORAL EVERY 8 HOURS PRN
Status: DISCONTINUED | OUTPATIENT
Start: 2024-11-06 | End: 2024-11-07 | Stop reason: HOSPADM

## 2024-11-06 RX ORDER — KETOROLAC TROMETHAMINE 30 MG/ML
15 INJECTION, SOLUTION INTRAMUSCULAR; INTRAVENOUS EVERY 6 HOURS PRN
Status: DISCONTINUED | OUTPATIENT
Start: 2024-11-06 | End: 2024-11-07 | Stop reason: HOSPADM

## 2024-11-06 RX ORDER — SODIUM CHLORIDE 9 MG/ML
INJECTION, SOLUTION INTRAVENOUS CONTINUOUS
Status: DISCONTINUED | OUTPATIENT
Start: 2024-11-06 | End: 2024-11-07

## 2024-11-06 RX ORDER — POLYETHYLENE GLYCOL 3350 17 G/17G
17 POWDER, FOR SOLUTION ORAL DAILY PRN
Status: DISCONTINUED | OUTPATIENT
Start: 2024-11-06 | End: 2024-11-07 | Stop reason: HOSPADM

## 2024-11-06 RX ORDER — PREDNISONE 20 MG/1
60 TABLET ORAL DAILY
Status: DISCONTINUED | OUTPATIENT
Start: 2024-11-06 | End: 2024-11-07 | Stop reason: HOSPADM

## 2024-11-06 RX ORDER — SODIUM CHLORIDE 0.9 % (FLUSH) 0.9 %
5-40 SYRINGE (ML) INJECTION PRN
Status: DISCONTINUED | OUTPATIENT
Start: 2024-11-06 | End: 2024-11-07 | Stop reason: HOSPADM

## 2024-11-06 RX ORDER — ALBUTEROL SULFATE 0.83 MG/ML
2.5 SOLUTION RESPIRATORY (INHALATION)
Status: DISCONTINUED | OUTPATIENT
Start: 2024-11-06 | End: 2024-11-06

## 2024-11-06 RX ORDER — ALBUTEROL SULFATE 0.83 MG/ML
2.5 SOLUTION RESPIRATORY (INHALATION) EVERY 4 HOURS
Status: DISCONTINUED | OUTPATIENT
Start: 2024-11-06 | End: 2024-11-06

## 2024-11-06 RX ORDER — SODIUM CHLORIDE 9 MG/ML
INJECTION, SOLUTION INTRAVENOUS PRN
Status: DISCONTINUED | OUTPATIENT
Start: 2024-11-06 | End: 2024-11-07 | Stop reason: HOSPADM

## 2024-11-06 RX ORDER — MAGNESIUM SULFATE IN WATER 40 MG/ML
2000 INJECTION, SOLUTION INTRAVENOUS PRN
Status: DISCONTINUED | OUTPATIENT
Start: 2024-11-06 | End: 2024-11-07 | Stop reason: HOSPADM

## 2024-11-06 RX ORDER — IPRATROPIUM BROMIDE AND ALBUTEROL SULFATE 2.5; .5 MG/3ML; MG/3ML
1 SOLUTION RESPIRATORY (INHALATION)
Status: DISCONTINUED | OUTPATIENT
Start: 2024-11-06 | End: 2024-11-07 | Stop reason: HOSPADM

## 2024-11-06 RX ORDER — ONDANSETRON 2 MG/ML
4 INJECTION INTRAMUSCULAR; INTRAVENOUS EVERY 6 HOURS PRN
Status: DISCONTINUED | OUTPATIENT
Start: 2024-11-06 | End: 2024-11-07 | Stop reason: HOSPADM

## 2024-11-06 RX ORDER — POTASSIUM CHLORIDE 750 MG/1
40 TABLET, EXTENDED RELEASE ORAL EVERY 4 HOURS
Status: COMPLETED | OUTPATIENT
Start: 2024-11-06 | End: 2024-11-06

## 2024-11-06 RX ORDER — ACETAMINOPHEN 325 MG/1
650 TABLET ORAL EVERY 6 HOURS PRN
Status: DISCONTINUED | OUTPATIENT
Start: 2024-11-06 | End: 2024-11-07 | Stop reason: HOSPADM

## 2024-11-06 RX ORDER — CODEINE PHOSPHATE AND GUAIFENESIN 10; 100 MG/5ML; MG/5ML
5 SOLUTION ORAL EVERY 4 HOURS PRN
Status: DISCONTINUED | OUTPATIENT
Start: 2024-11-06 | End: 2024-11-07 | Stop reason: HOSPADM

## 2024-11-06 RX ORDER — 0.9 % SODIUM CHLORIDE 0.9 %
500 INTRAVENOUS SOLUTION INTRAVENOUS ONCE
Status: COMPLETED | OUTPATIENT
Start: 2024-11-06 | End: 2024-11-06

## 2024-11-06 RX ORDER — POTASSIUM CHLORIDE 7.45 MG/ML
10 INJECTION INTRAVENOUS PRN
Status: DISCONTINUED | OUTPATIENT
Start: 2024-11-06 | End: 2024-11-06

## 2024-11-06 RX ADMIN — DOXYCYCLINE 100 MG: 100 INJECTION, POWDER, LYOPHILIZED, FOR SOLUTION INTRAVENOUS at 17:18

## 2024-11-06 RX ADMIN — CEFEPIME 2000 MG: 2 INJECTION, POWDER, FOR SOLUTION INTRAVENOUS at 08:48

## 2024-11-06 RX ADMIN — ALBUTEROL SULFATE 2.5 MG: 2.5 SOLUTION RESPIRATORY (INHALATION) at 08:19

## 2024-11-06 RX ADMIN — ENOXAPARIN SODIUM 40 MG: 100 INJECTION SUBCUTANEOUS at 08:43

## 2024-11-06 RX ADMIN — IPRATROPIUM BROMIDE AND ALBUTEROL SULFATE 1 DOSE: .5; 3 SOLUTION RESPIRATORY (INHALATION) at 20:57

## 2024-11-06 RX ADMIN — IPRATROPIUM BROMIDE AND ALBUTEROL SULFATE 1 DOSE: .5; 3 SOLUTION RESPIRATORY (INHALATION) at 12:49

## 2024-11-06 RX ADMIN — SODIUM CHLORIDE 500 ML: 9 INJECTION, SOLUTION INTRAVENOUS at 13:23

## 2024-11-06 RX ADMIN — PREDNISONE 60 MG: 20 TABLET ORAL at 07:07

## 2024-11-06 RX ADMIN — KETOROLAC TROMETHAMINE 15 MG: 30 INJECTION, SOLUTION INTRAMUSCULAR at 05:57

## 2024-11-06 RX ADMIN — DOXYCYCLINE 100 MG: 100 INJECTION, POWDER, LYOPHILIZED, FOR SOLUTION INTRAVENOUS at 05:57

## 2024-11-06 RX ADMIN — GUAIFENESIN AND CODEINE PHOSPHATE 5 ML: 100; 10 SOLUTION ORAL at 05:57

## 2024-11-06 RX ADMIN — CEFEPIME 2000 MG: 2 INJECTION, POWDER, FOR SOLUTION INTRAVENOUS at 18:44

## 2024-11-06 RX ADMIN — POTASSIUM CHLORIDE 40 MEQ: 750 TABLET, EXTENDED RELEASE ORAL at 08:42

## 2024-11-06 RX ADMIN — WATER 2000 MG: 1 INJECTION INTRAMUSCULAR; INTRAVENOUS; SUBCUTANEOUS at 01:07

## 2024-11-06 RX ADMIN — POTASSIUM CHLORIDE 40 MEQ: 750 TABLET, EXTENDED RELEASE ORAL at 05:58

## 2024-11-06 RX ADMIN — IOPAMIDOL 100 ML: 755 INJECTION, SOLUTION INTRAVENOUS at 00:13

## 2024-11-06 RX ADMIN — SODIUM CHLORIDE, PRESERVATIVE FREE 10 ML: 5 INJECTION INTRAVENOUS at 20:34

## 2024-11-06 RX ADMIN — ACETAMINOPHEN 650 MG: 325 TABLET ORAL at 06:39

## 2024-11-06 RX ADMIN — SODIUM CHLORIDE: 9 INJECTION, SOLUTION INTRAVENOUS at 05:54

## 2024-11-06 ASSESSMENT — ENCOUNTER SYMPTOMS
DIARRHEA: 0
COUGH: 1
SHORTNESS OF BREATH: 1
VOMITING: 0
BACK PAIN: 0
ABDOMINAL PAIN: 0
COLOR CHANGE: 0
SORE THROAT: 1

## 2024-11-06 ASSESSMENT — PAIN SCALES - GENERAL
PAINLEVEL_OUTOF10: 10
PAINLEVEL_OUTOF10: 9
PAINLEVEL_OUTOF10: 10
PAINLEVEL_OUTOF10: 0

## 2024-11-06 ASSESSMENT — PAIN DESCRIPTION - LOCATION
LOCATION: HEAD
LOCATION: HEAD;OTHER (COMMENT)
LOCATION: HEAD;CHEST

## 2024-11-06 ASSESSMENT — PAIN SCALES - WONG BAKER: WONGBAKER_NUMERICALRESPONSE: NO HURT

## 2024-11-06 ASSESSMENT — PAIN DESCRIPTION - DESCRIPTORS
DESCRIPTORS: ACHING
DESCRIPTORS: ACHING

## 2024-11-06 NOTE — ED NOTES
Bedside and Verbal shift change report given to MYLES Cano (oncoming nurse) by MYLES Dubon (offgoing nurse). Report included the following information Nurse Handoff Report, ED Encounter Summary, ED SBAR, Intake/Output, MAR, and Recent Results.

## 2024-11-06 NOTE — ED NOTES
.ED TO INPATIENT SBAR HANDOFF    Patient Name: Ayanna Pratt   :  2000  23 y.o.   MRN:  [id number]  ED Room #:  ER19/19     Situation  Code Status: Full Code   Allergies: Patient has no known allergies.  Weight: Patient Vitals for the past 96 hrs (Last 3 readings):   Weight   24 1917 67.9 kg (149 lb 11.1 oz)       Arrived from: home    Chief Complaint:   Chief Complaint   Patient presents with    Headache    Fever    Back Pain       Hospital Problem/Diagnosis:  Principal Problem:    SIRS (systemic inflammatory response syndrome) (HCC)  Resolved Problems:    * No resolved hospital problems. *      Mobility: no current mobility problem   ED Fall Risk: Presents to emergency department  because of falls (Syncope, seizure, or loss of consciousness): No, Age > 70: No, Altered Mental Status, Intoxication with alcohol or substance confusion (Disorientation, impaired judgment, poor safety awaremess, or inability to follow instructions): No, Impaired Mobility: Ambulates or transfers with assistive devices or assistance; Unable to ambulate or transer.: No, Nursing Judgement: No   Fell in ED or prior to admission:    Restraints:      Sitter:    Family/Caregiver Present:     Neet to know social/safety information:     Background  History:   Past Medical History:   Diagnosis Date    Anemia     Essential hypertension     Gestational hypertension     Headache        Assessment    Abnormal Assessment Findings: CT & Resp Panel  Imaging:   CT CHEST ABDOMEN PELVIS W CONTRAST Additional Contrast? None   Final Result      1. Patchy areas of groundglass opacity in the right lower lobe are suspicious   for pneumonia.   2. No evidence of acute process in the abdomen or pelvis.         Electronically signed by Justin Tanner      XR CHEST (2 VW)   Final Result      No acute process         Electronically signed by Clifton Moya        Abnormal labs:   Abnormal Labs Reviewed   RESPIRATORY PANEL, MOLECULAR, WITH COVID-19 -

## 2024-11-06 NOTE — ED NOTES
Message placed with Dr Tidwell to address pt concern for head and chest pain as well as pt temperature of 101.1; awaiting response

## 2024-11-06 NOTE — H&P
PM   Result Value Ref Range    D-Dimer, Quant 0.45 0.00 - 0.65 mg/L FEU   Troponin    Collection Time: 11/05/24  7:45 PM   Result Value Ref Range    Troponin, High Sensitivity <4 0 - 51 ng/L   Lactic Acid    Collection Time: 11/05/24  7:45 PM   Result Value Ref Range    Lactic Acid, Plasma 1.1 0.4 - 2.0 MMOL/L   COVID-19 & Influenza Combo    Collection Time: 11/05/24  7:45 PM    Specimen: Nasopharyngeal   Result Value Ref Range    Source Nasopharyngeal      SARS-CoV-2, PCR Not detected NOTD      Rapid Influenza A By PCR Not detected NOTD      Rapid Influenza B By PCR Not detected NOTD     Group A Strep by PCR    Collection Time: 11/05/24  7:45 PM    Specimen: Swab; Throat   Result Value Ref Range    Strep Grp A PCR Not detected NOTD     Urinalysis with Microscopic    Collection Time: 11/05/24  7:45 PM   Result Value Ref Range    Color, UA YELLOW/STRAW      Appearance CLEAR CLEAR      Specific Gravity, UA 1.018 1.003 - 1.030      pH, Urine 7.0 5.0 - 8.0      Protein, UA Negative NEG mg/dL    Glucose, Ur Negative NEG mg/dL    Ketones, Urine Negative NEG mg/dL    Bilirubin, Urine Negative NEG      Blood, Urine Negative NEG      Urobilinogen, Urine 0.2 0.2 - 1.0 EU/dL    Nitrite, Urine Negative NEG      Leukocyte Esterase, Urine Negative NEG      WBC, UA 0-4 0 - 4 /hpf    RBC, UA 0-5 0 - 5 /hpf    Epithelial Cells, UA FEW FEW /lpf    BACTERIA, URINE 1+ (A) NEG /hpf   Urine Culture Hold Sample    Collection Time: 11/05/24  7:45 PM    Specimen: Urine   Result Value Ref Range    Specimen HOld        Urine on hold in Microbiology dept for 2 days.  If unpreserved urine is submitted, it cannot be used for addtional testing after 24 hours, recollection will be required.   POC Pregnancy Urine Qual    Collection Time: 11/05/24  7:51 PM   Result Value Ref Range    Preg Test, Ur Negative NEG           Imaging:     Assessment:     Ayanna Pratt is a 23 y.o. female with hx of gestational htn, anemia, headaches who is admitted for

## 2024-11-06 NOTE — ED TRIAGE NOTES
She started with a cough and headache Saturday. Today the headache was so bad she had to leave work. She also has some back pain. Seen by a provider in triage.

## 2024-11-06 NOTE — ED PROVIDER NOTES
ACETAMINOPHEN (TYLENOL) 500 MG TABLET    Take 1 tablet by mouth every 4 hours as needed for Pain or Fever    ALBUTEROL SULFATE HFA (VENTOLIN HFA) 108 (90 BASE) MCG/ACT INHALER    Inhale 2 puffs into the lungs 4 times daily as needed for Wheezing    KETOROLAC (TORADOL) 10 MG TABLET    Take 1 tablet by mouth every 6 hours as needed for Pain    LABETALOL (NORMODYNE) 200 MG TABLET    Take 200 mg by mouth in the morning and 200 mg in the evening.       ALLERGIES     Patient has no known allergies.    FAMILY HISTORY       Family History   Problem Relation Age of Onset    No Known Problems Mother     Hypertension Father         SOCIAL HISTORY       Social History     Socioeconomic History    Marital status:    Tobacco Use    Smoking status: Never    Smokeless tobacco: Never   Vaping Use    Vaping status: Never Used   Substance and Sexual Activity    Alcohol use: No    Drug use: No    Sexual activity: Not Currently     Birth control/protection: I.U.D.         PHYSICAL EXAM    (up to 7 for level 4, 8 or more for level 5)   Vitals:   Vitals:    11/05/24 1917 11/05/24 2030 11/05/24 2244 11/05/24 2356   BP: 132/88  120/83    Pulse: (!) 136 (!) 130 (!) 125 (!) 122   Resp: 18  19    Temp: (!) 100.7 °F (38.2 °C) 100.1 °F (37.8 °C) 98.3 °F (36.8 °C) 99.4 °F (37.4 °C)   TempSrc: Oral Oral Oral Oral   SpO2: 98%  97%    Weight: 67.9 kg (149 lb 11.1 oz)          Physical Exam  Vitals and nursing note reviewed.   Constitutional:       General: She is not in acute distress.     Appearance: Normal appearance. She is not toxic-appearing or diaphoretic.   HENT:      Head: Normocephalic and atraumatic.      Jaw: There is normal jaw occlusion.      Right Ear: Tympanic membrane normal.      Left Ear: Tympanic membrane normal.      Nose: Congestion present.      Mouth/Throat:      Pharynx: Oropharynx is clear.      Tonsils: No tonsillar abscesses.   Eyes:      Extraocular Movements: Extraocular movements intact.      Conjunctiva/sclera:

## 2024-11-07 VITALS
SYSTOLIC BLOOD PRESSURE: 116 MMHG | RESPIRATION RATE: 18 BRPM | DIASTOLIC BLOOD PRESSURE: 89 MMHG | HEART RATE: 79 BPM | TEMPERATURE: 98.1 F | WEIGHT: 149.69 LBS | BODY MASS INDEX: 26.52 KG/M2 | OXYGEN SATURATION: 99 %

## 2024-11-07 LAB
ANION GAP SERPL CALC-SCNC: 4 MMOL/L (ref 2–12)
BUN SERPL-MCNC: 4 MG/DL (ref 6–20)
BUN/CREAT SERPL: 6 (ref 12–20)
CALCIUM SERPL-MCNC: 8.4 MG/DL (ref 8.5–10.1)
CHLORIDE SERPL-SCNC: 111 MMOL/L (ref 97–108)
CO2 SERPL-SCNC: 24 MMOL/L (ref 21–32)
COMMENT:: NORMAL
CREAT SERPL-MCNC: 0.68 MG/DL (ref 0.55–1.02)
GLUCOSE SERPL-MCNC: 116 MG/DL (ref 65–100)
POTASSIUM SERPL-SCNC: 4 MMOL/L (ref 3.5–5.1)
SODIUM SERPL-SCNC: 139 MMOL/L (ref 136–145)
SPECIMEN HOLD: NORMAL

## 2024-11-07 PROCEDURE — 6370000000 HC RX 637 (ALT 250 FOR IP): Performed by: STUDENT IN AN ORGANIZED HEALTH CARE EDUCATION/TRAINING PROGRAM

## 2024-11-07 PROCEDURE — 6370000000 HC RX 637 (ALT 250 FOR IP): Performed by: NURSE PRACTITIONER

## 2024-11-07 PROCEDURE — 36415 COLL VENOUS BLD VENIPUNCTURE: CPT

## 2024-11-07 PROCEDURE — 2500000003 HC RX 250 WO HCPCS: Performed by: STUDENT IN AN ORGANIZED HEALTH CARE EDUCATION/TRAINING PROGRAM

## 2024-11-07 PROCEDURE — 2580000003 HC RX 258: Performed by: STUDENT IN AN ORGANIZED HEALTH CARE EDUCATION/TRAINING PROGRAM

## 2024-11-07 PROCEDURE — 80048 BASIC METABOLIC PNL TOTAL CA: CPT

## 2024-11-07 PROCEDURE — 94640 AIRWAY INHALATION TREATMENT: CPT

## 2024-11-07 PROCEDURE — 6360000002 HC RX W HCPCS: Performed by: STUDENT IN AN ORGANIZED HEALTH CARE EDUCATION/TRAINING PROGRAM

## 2024-11-07 RX ORDER — DOXYCYCLINE HYCLATE 100 MG
100 TABLET ORAL 2 TIMES DAILY
Qty: 10 TABLET | Refills: 0 | Status: SHIPPED | OUTPATIENT
Start: 2024-11-07 | End: 2024-11-07

## 2024-11-07 RX ORDER — DOXYCYCLINE HYCLATE 100 MG
100 TABLET ORAL 2 TIMES DAILY
Qty: 10 TABLET | Refills: 0 | Status: SHIPPED | OUTPATIENT
Start: 2024-11-07 | End: 2024-11-12

## 2024-11-07 RX ORDER — CODEINE PHOSPHATE AND GUAIFENESIN 10; 100 MG/5ML; MG/5ML
5 SOLUTION ORAL EVERY 4 HOURS PRN
Qty: 150 ML | Refills: 0 | Status: SHIPPED | OUTPATIENT
Start: 2024-11-07 | End: 2024-11-07

## 2024-11-07 RX ORDER — PREDNISONE 10 MG/1
TABLET ORAL
Qty: 18 TABLET | Refills: 0 | Status: SHIPPED | OUTPATIENT
Start: 2024-11-08 | End: 2024-11-14

## 2024-11-07 RX ORDER — CODEINE PHOSPHATE AND GUAIFENESIN 10; 100 MG/5ML; MG/5ML
5 SOLUTION ORAL EVERY 4 HOURS PRN
Qty: 150 ML | Refills: 0 | Status: SHIPPED | OUTPATIENT
Start: 2024-11-07 | End: 2024-11-12

## 2024-11-07 RX ORDER — PREDNISONE 10 MG/1
TABLET ORAL
Qty: 18 TABLET | Refills: 0 | Status: SHIPPED | OUTPATIENT
Start: 2024-11-08 | End: 2024-11-07

## 2024-11-07 RX ADMIN — IPRATROPIUM BROMIDE AND ALBUTEROL SULFATE 1 DOSE: .5; 3 SOLUTION RESPIRATORY (INHALATION) at 12:56

## 2024-11-07 RX ADMIN — DOXYCYCLINE 100 MG: 100 INJECTION, POWDER, LYOPHILIZED, FOR SOLUTION INTRAVENOUS at 16:37

## 2024-11-07 RX ADMIN — GUAIFENESIN AND CODEINE PHOSPHATE 5 ML: 100; 10 SOLUTION ORAL at 08:24

## 2024-11-07 RX ADMIN — Medication 1 LOZENGE: at 00:58

## 2024-11-07 RX ADMIN — ENOXAPARIN SODIUM 40 MG: 100 INJECTION SUBCUTANEOUS at 08:23

## 2024-11-07 RX ADMIN — SODIUM CHLORIDE: 9 INJECTION, SOLUTION INTRAVENOUS at 00:52

## 2024-11-07 RX ADMIN — KETOROLAC TROMETHAMINE 15 MG: 30 INJECTION, SOLUTION INTRAMUSCULAR at 00:58

## 2024-11-07 RX ADMIN — CEFEPIME 2000 MG: 2 INJECTION, POWDER, FOR SOLUTION INTRAVENOUS at 00:54

## 2024-11-07 RX ADMIN — DOXYCYCLINE 100 MG: 100 INJECTION, POWDER, LYOPHILIZED, FOR SOLUTION INTRAVENOUS at 05:02

## 2024-11-07 RX ADMIN — SODIUM CHLORIDE, PRESERVATIVE FREE 10 ML: 5 INJECTION INTRAVENOUS at 08:25

## 2024-11-07 RX ADMIN — PREDNISONE 60 MG: 20 TABLET ORAL at 08:24

## 2024-11-07 RX ADMIN — CEFEPIME 2000 MG: 2 INJECTION, POWDER, FOR SOLUTION INTRAVENOUS at 08:26

## 2024-11-07 RX ADMIN — IPRATROPIUM BROMIDE AND ALBUTEROL SULFATE 1 DOSE: .5; 3 SOLUTION RESPIRATORY (INHALATION) at 07:21

## 2024-11-07 RX ADMIN — GUAIFENESIN AND CODEINE PHOSPHATE 5 ML: 100; 10 SOLUTION ORAL at 00:58

## 2024-11-07 ASSESSMENT — PAIN DESCRIPTION - LOCATION: LOCATION: HEAD

## 2024-11-07 ASSESSMENT — PAIN SCALES - GENERAL: PAINLEVEL_OUTOF10: 8

## 2024-11-07 ASSESSMENT — PAIN DESCRIPTION - DESCRIPTORS: DESCRIPTORS: ACHING

## 2024-11-07 NOTE — DISCHARGE SUMMARY
Discharge Summary       PATIENT ID: Ayanna Pratt  MRN: [id number]   YOB: 2000    DATE OF ADMISSION: 11/5/2024  7:54 PM    DATE OF DISCHARGE: 11/7/24   PRIMARY CARE PROVIDER: Ricco Culver MD     ATTENDING PHYSICIAN: brittany lawson  DISCHARGING PROVIDER: Brittany Lawson MD    To contact this individual call [phone] and ask the  to page.  If unavailable ask to be transferred the Adult Hospitalist Department.    CONSULTATIONS: None    PROCEDURES/SURGERIES: * No surgery found *    ADMITTING DIAGNOSES & HOSPITAL COURSE:     Ms. Pratt is a 23 y.o. female with hx of gestational htn, anemia and, headaches who presented to the ED with 4 days of cough, congestion, fever, headaches, intermittent dyspnea and pressure-like chest discomfort.  No recent travel or sick contacts. Chest Xray showed no acute process.     Patient was treated for pneumonia with cefepime and doxycycline turned out to be Mycoplasma pneumoniae patient discharged on doxycycline for 7 days culture negative        DISCHARGE DIAGNOSES / PLAN:       D/c home       PENDING TEST RESULTS:   At the time of discharge the following test results are still pending:     FOLLOW UP APPOINTMENTS:    [unfilled]     ADDITIONAL CARE RECOMMENDATIONS:     DIET: regular diet      ACTIVITY: activity as tolerated    WOUND CARE:     EQUIPMENT needed:       DISCHARGE MEDICATIONS:     Medication List        START taking these medications      doxycycline hyclate 100 MG tablet  Commonly known as: VIBRA-TABS  Take 1 tablet by mouth 2 times daily for 5 days     guaiFENesin-codeine 100-10 MG/5ML liquid  Commonly known as: guaiFENesin AC  Take 5 mLs by mouth every 4 hours as needed for Cough for up to 5 days. Max Daily Amount: 30 mLs     predniSONE 10 MG tablet  Commonly known as: DELTASONE  Take 4 tablets by mouth daily for 3 days, THEN 2 tablets daily for 3 days.  Start taking on: November 8, 2024            CONTINUE taking these

## 2024-11-07 NOTE — DISCHARGE INSTRUCTIONS
Discharge Instructions       PATIENT ID: Ayanna Pratt  MRN: [id number]   YOB: 2000    DATE OF ADMISSION: 11/5/2024   DATE OF DISCHARGE: 11/7/2024    PRIMARY CARE PROVIDER: Ricco Culver     ATTENDING PHYSICIAN: Brittany Lawson MD   DISCHARGING PROVIDER: Brittany Lawson MD    To contact this individual call [phone] and ask the  to page.   If unavailable ask to be transferred the Adult Hospitalist Department.    DISCHARGE DIAGNOSES Mycoplasma PNA    CONSULTATIONS: [unfilled]    PROCEDURES/SURGERIES: * No surgery found *    PENDING TEST RESULTS:   At the time of discharge the following test results are still pending:     FOLLOW UP APPOINTMENTS:   [unfilled]     ADDITIONAL CARE RECOMMENDATIONS:     DIET: regular diet      ACTIVITY: activity as tolerated    WOUND CARE:     EQUIPMENT needed:       DISCHARGE MEDICATIONS:   See Medication Reconciliation Form    It is important that you take the medication exactly as they are prescribed.   Keep your medication in the bottles provided by the pharmacist and keep a list of the medication names, dosages, and times to be taken in your wallet.   Do not take other medications without consulting your doctor.       NOTIFY YOUR PHYSICIAN FOR ANY OF THE FOLLOWING:   Fever over 101 degrees for 24 hours.   Chest pain, shortness of breath, fever, chills, nausea, vomiting, diarrhea, change in mentation, falling, weakness, bleeding. Severe pain or pain not relieved by medications.  Or, any other signs or symptoms that you may have questions about.      DISPOSITION:  x  Home With:   OT  PT  HH  RN       SNF/Inpatient Rehab/LTAC    Independent/assisted living    Hospice    Other:     CDMP Checked:   Yes x     PROBLEM LIST Updated:  Yes x       Signed:   Brittany Lawson MD  11/7/2024  2:43 PM

## 2024-11-07 NOTE — PROGRESS NOTES
11/7/2024        RE: Ayanna Pratt         [address] [id number]          To Whom It May Concern,      Due to medical reasons, Aaynna Pratt was admitted to the hospital from 11/5/24 through 11/7/24. She should remain out of work until at least 11/8/24.        Sincerely,          Emily Suarez RN    
  ALT 46   GLOB 5.5*     No results for input(s): \"INR\", \"APTT\" in the last 72 hours.    Invalid input(s): \"PTP\"   No results for input(s): \"TIBC\" in the last 72 hours.    Invalid input(s): \"FE\", \"PSAT\", \"FERR\"   No results found for: \"RBCF\"   No results for input(s): \"PH\", \"PCO2\", \"PO2\" in the last 72 hours.  No results for input(s): \"CPK\" in the last 72 hours.    Invalid input(s): \"CPKMB\", \"CKNDX\", \"TROIQ\"  No results found for: \"CHOL\", \"CHLST\", \"CHOLV\", \"HDL\", \"HDLC\", \"LDL\", \"LDLC\"  No results found for: \"GLUCPOC\"    Medications Reviewed:     Current Facility-Administered Medications   Medication Dose Route Frequency    ceFEPIme (MAXIPIME) 2,000 mg in sodium chloride 0.9 % 100 mL IVPB (mini-bag)  2,000 mg IntraVENous Q8H    sodium chloride flush 0.9 % injection 5-40 mL  5-40 mL IntraVENous 2 times per day    sodium chloride flush 0.9 % injection 5-40 mL  5-40 mL IntraVENous PRN    0.9 % sodium chloride infusion   IntraVENous PRN    potassium chloride (KLOR-CON) extended release tablet 40 mEq  40 mEq Oral PRN    Or    potassium bicarb-citric acid (EFFER-K) effervescent tablet 40 mEq  40 mEq Oral PRN    Or    potassium chloride 10 mEq/100 mL IVPB (Peripheral Line)  10 mEq IntraVENous PRN    magnesium sulfate 2000 mg in 50 mL IVPB premix  2,000 mg IntraVENous PRN    enoxaparin (LOVENOX) injection 40 mg  40 mg SubCUTAneous Daily    ondansetron (ZOFRAN-ODT) disintegrating tablet 4 mg  4 mg Oral Q8H PRN    Or    ondansetron (ZOFRAN) injection 4 mg  4 mg IntraVENous Q6H PRN    polyethylene glycol (GLYCOLAX) packet 17 g  17 g Oral Daily PRN    acetaminophen (TYLENOL) tablet 650 mg  650 mg Oral Q6H PRN    Or    acetaminophen (TYLENOL) suppository 650 mg  650 mg Rectal Q6H PRN    ketorolac (TORADOL) injection 15 mg  15 mg IntraVENous Q6H PRN    guaiFENesin-codeine (guaiFENesin AC) 100-10 MG/5ML liquid 5 mL  5 mL Oral Q4H PRN    potassium chloride (KLOR-CON) extended release tablet 40 mEq  40 mEq Oral Q4H    0.9 % sodium

## 2024-11-07 NOTE — CONSULTS
Session ID: [id number]  Language: [facility]   ID: #[id number]   Name: Lisa Azathioprine Counseling:  I discussed with the patient the risks of azathioprine including but not limited to myelosuppression, immunosuppression, hepatotoxicity, lymphoma, and infections.  The patient understands that monitoring is required including baseline LFTs, Creatinine, possible TPMP genotyping and weekly CBCs for the first month and then every 2 weeks thereafter.  The patient verbalized understanding of the proper use and possible adverse effects of azathioprine.  All of the patient's questions and concerns were addressed.

## 2024-11-07 NOTE — CONSULTS
Session ID: [id number]  Language: [facility]   ID: #[id number]   Name: Bridgette

## 2024-11-08 NOTE — PLAN OF CARE
Discharge instructions reviewed with patient and spouse. patient and spouse provided verbal understanding off discharge instructions. Opportunity for questions and clarification provided. Patient discharged home via family transportation. Patient ambulated off the unit in no acute distress    Problem: Discharge Planning  Goal: Discharge to home or other facility with appropriate resources  11/7/2024 1907 by Emily Suarez, RN  Outcome: Completed  11/7/2024 1148 by Dixie Almaraz  Outcome: Progressing     Problem: Pain  Goal: Verbalizes/displays adequate comfort level or baseline comfort level  11/7/2024 1907 by Emily Suarez, RN  Outcome: Completed  11/7/2024 1224 by Dixie Almaraz  Outcome: Progressing     Problem: Safety - Adult  Goal: Free from fall injury  Outcome: Completed

## 2024-11-10 LAB
BACTERIA SPEC CULT: NORMAL
SERVICE CMNT-IMP: NORMAL